# Patient Record
Sex: FEMALE | Race: WHITE | NOT HISPANIC OR LATINO | Employment: FULL TIME | URBAN - METROPOLITAN AREA
[De-identification: names, ages, dates, MRNs, and addresses within clinical notes are randomized per-mention and may not be internally consistent; named-entity substitution may affect disease eponyms.]

---

## 2017-01-05 ENCOUNTER — OFFICE VISIT (OUTPATIENT)
Dept: PSYCHOLOGY | Facility: CLINIC | Age: 37
End: 2017-01-05
Payer: COMMERCIAL

## 2017-01-05 DIAGNOSIS — F43.23 ADJUSTMENT DISORDER WITH MIXED ANXIETY AND DEPRESSED MOOD: Primary | ICD-10-CM

## 2017-01-05 DIAGNOSIS — Z63.5 MARRIAGE SEPARATION: ICD-10-CM

## 2017-01-05 PROCEDURE — 90834 PSYTX W PT 45 MINUTES: CPT | Performed by: MARRIAGE & FAMILY THERAPIST

## 2017-01-05 SDOH — SOCIAL STABILITY - SOCIAL INSECURITY: DISRUPTION OF FAMILY BY SEPARATION AND DIVORCE: Z63.5

## 2017-01-05 ASSESSMENT — ANXIETY QUESTIONNAIRES
5. BEING SO RESTLESS THAT IT IS HARD TO SIT STILL: NOT AT ALL
2. NOT BEING ABLE TO STOP OR CONTROL WORRYING: NOT AT ALL
3. WORRYING TOO MUCH ABOUT DIFFERENT THINGS: SEVERAL DAYS
GAD7 TOTAL SCORE: 3
1. FEELING NERVOUS, ANXIOUS, OR ON EDGE: SEVERAL DAYS
6. BECOMING EASILY ANNOYED OR IRRITABLE: NOT AT ALL
IF YOU CHECKED OFF ANY PROBLEMS ON THIS QUESTIONNAIRE, HOW DIFFICULT HAVE THESE PROBLEMS MADE IT FOR YOU TO DO YOUR WORK, TAKE CARE OF THINGS AT HOME, OR GET ALONG WITH OTHER PEOPLE: SOMEWHAT DIFFICULT
7. FEELING AFRAID AS IF SOMETHING AWFUL MIGHT HAPPEN: NOT AT ALL

## 2017-01-05 ASSESSMENT — PATIENT HEALTH QUESTIONNAIRE - PHQ9: 5. POOR APPETITE OR OVEREATING: SEVERAL DAYS
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
## 2017-01-06 ASSESSMENT — PATIENT HEALTH QUESTIONNAIRE - PHQ9: SUM OF ALL RESPONSES TO PHQ QUESTIONS 1-9: 5

## 2017-01-06 ASSESSMENT — ANXIETY QUESTIONNAIRES: GAD7 TOTAL SCORE: 3

## 2017-02-09 ENCOUNTER — OFFICE VISIT (OUTPATIENT)
Dept: PSYCHOLOGY | Facility: CLINIC | Age: 37
End: 2017-02-09
Payer: COMMERCIAL

## 2017-02-09 DIAGNOSIS — Z63.5 MARRIAGE SEPARATION: ICD-10-CM

## 2017-02-09 DIAGNOSIS — F43.23 ADJUSTMENT DISORDER WITH MIXED ANXIETY AND DEPRESSED MOOD: Primary | ICD-10-CM

## 2017-02-09 PROCEDURE — 90834 PSYTX W PT 45 MINUTES: CPT | Performed by: MARRIAGE & FAMILY THERAPIST

## 2017-02-09 SDOH — SOCIAL STABILITY - SOCIAL INSECURITY: DISRUPTION OF FAMILY BY SEPARATION AND DIVORCE: Z63.5

## 2017-02-09 ASSESSMENT — ANXIETY QUESTIONNAIRES
3. WORRYING TOO MUCH ABOUT DIFFERENT THINGS: MORE THAN HALF THE DAYS
IF YOU CHECKED OFF ANY PROBLEMS ON THIS QUESTIONNAIRE, HOW DIFFICULT HAVE THESE PROBLEMS MADE IT FOR YOU TO DO YOUR WORK, TAKE CARE OF THINGS AT HOME, OR GET ALONG WITH OTHER PEOPLE: SOMEWHAT DIFFICULT
2. NOT BEING ABLE TO STOP OR CONTROL WORRYING: SEVERAL DAYS
6. BECOMING EASILY ANNOYED OR IRRITABLE: SEVERAL DAYS
5. BEING SO RESTLESS THAT IT IS HARD TO SIT STILL: NOT AT ALL
1. FEELING NERVOUS, ANXIOUS, OR ON EDGE: MORE THAN HALF THE DAYS
GAD7 TOTAL SCORE: 8
7. FEELING AFRAID AS IF SOMETHING AWFUL MIGHT HAPPEN: SEVERAL DAYS

## 2017-02-09 ASSESSMENT — PATIENT HEALTH QUESTIONNAIRE - PHQ9: 5. POOR APPETITE OR OVEREATING: SEVERAL DAYS
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
## 2017-02-10 ASSESSMENT — ANXIETY QUESTIONNAIRES: GAD7 TOTAL SCORE: 8

## 2017-02-10 ASSESSMENT — PATIENT HEALTH QUESTIONNAIRE - PHQ9: SUM OF ALL RESPONSES TO PHQ QUESTIONS 1-9: 6

## 2017-03-14 ENCOUNTER — OFFICE VISIT (OUTPATIENT)
Dept: PSYCHOLOGY | Facility: CLINIC | Age: 37
End: 2017-03-14
Payer: COMMERCIAL

## 2017-03-14 DIAGNOSIS — Z63.5 MARRIAGE SEPARATION: ICD-10-CM

## 2017-03-14 DIAGNOSIS — F43.23 ADJUSTMENT DISORDER WITH MIXED ANXIETY AND DEPRESSED MOOD: Primary | ICD-10-CM

## 2017-03-14 PROCEDURE — 90834 PSYTX W PT 45 MINUTES: CPT | Performed by: MARRIAGE & FAMILY THERAPIST

## 2017-03-14 SDOH — SOCIAL STABILITY - SOCIAL INSECURITY: DISRUPTION OF FAMILY BY SEPARATION AND DIVORCE: Z63.5

## 2017-03-14 ASSESSMENT — ANXIETY QUESTIONNAIRES
5. BEING SO RESTLESS THAT IT IS HARD TO SIT STILL: NOT AT ALL
GAD7 TOTAL SCORE: 4
7. FEELING AFRAID AS IF SOMETHING AWFUL MIGHT HAPPEN: NOT AT ALL
3. WORRYING TOO MUCH ABOUT DIFFERENT THINGS: SEVERAL DAYS
6. BECOMING EASILY ANNOYED OR IRRITABLE: NOT AT ALL
2. NOT BEING ABLE TO STOP OR CONTROL WORRYING: SEVERAL DAYS
IF YOU CHECKED OFF ANY PROBLEMS ON THIS QUESTIONNAIRE, HOW DIFFICULT HAVE THESE PROBLEMS MADE IT FOR YOU TO DO YOUR WORK, TAKE CARE OF THINGS AT HOME, OR GET ALONG WITH OTHER PEOPLE: SOMEWHAT DIFFICULT
1. FEELING NERVOUS, ANXIOUS, OR ON EDGE: SEVERAL DAYS

## 2017-03-14 ASSESSMENT — PATIENT HEALTH QUESTIONNAIRE - PHQ9: 5. POOR APPETITE OR OVEREATING: SEVERAL DAYS
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
## 2017-03-15 ASSESSMENT — ANXIETY QUESTIONNAIRES: GAD7 TOTAL SCORE: 4

## 2017-03-15 ASSESSMENT — PATIENT HEALTH QUESTIONNAIRE - PHQ9: SUM OF ALL RESPONSES TO PHQ QUESTIONS 1-9: 6

## 2017-04-13 ENCOUNTER — OFFICE VISIT (OUTPATIENT)
Dept: PSYCHOLOGY | Facility: CLINIC | Age: 37
End: 2017-04-13
Payer: COMMERCIAL

## 2017-04-13 DIAGNOSIS — F43.23 ADJUSTMENT DISORDER WITH MIXED ANXIETY AND DEPRESSED MOOD: Primary | ICD-10-CM

## 2017-04-13 DIAGNOSIS — Z63.5 MARRIAGE SEPARATION: ICD-10-CM

## 2017-04-13 PROCEDURE — 90834 PSYTX W PT 45 MINUTES: CPT | Performed by: MARRIAGE & FAMILY THERAPIST

## 2017-04-13 SDOH — SOCIAL STABILITY - SOCIAL INSECURITY: DISRUPTION OF FAMILY BY SEPARATION AND DIVORCE: Z63.5

## 2017-04-13 ASSESSMENT — ANXIETY QUESTIONNAIRES
GAD7 TOTAL SCORE: 5
6. BECOMING EASILY ANNOYED OR IRRITABLE: NOT AT ALL
2. NOT BEING ABLE TO STOP OR CONTROL WORRYING: SEVERAL DAYS
7. FEELING AFRAID AS IF SOMETHING AWFUL MIGHT HAPPEN: SEVERAL DAYS
IF YOU CHECKED OFF ANY PROBLEMS ON THIS QUESTIONNAIRE, HOW DIFFICULT HAVE THESE PROBLEMS MADE IT FOR YOU TO DO YOUR WORK, TAKE CARE OF THINGS AT HOME, OR GET ALONG WITH OTHER PEOPLE: SOMEWHAT DIFFICULT
3. WORRYING TOO MUCH ABOUT DIFFERENT THINGS: SEVERAL DAYS
1. FEELING NERVOUS, ANXIOUS, OR ON EDGE: SEVERAL DAYS
5. BEING SO RESTLESS THAT IT IS HARD TO SIT STILL: NOT AT ALL

## 2017-04-13 ASSESSMENT — PATIENT HEALTH QUESTIONNAIRE - PHQ9: 5. POOR APPETITE OR OVEREATING: SEVERAL DAYS
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
## 2017-04-13 NOTE — MR AVS SNAPSHOT
MRN:[id number]                      After Visit Summary   4/13/2017    Cely Mott    MRN: [id number]           Visit Information        Provider Department      4/13/2017 3:00 PM Russ Jackson LMFT [facility]      Your next 10 appointments already scheduled     May 10, 2017  3:00 PM CDT   Return Visit with Russ IVY Jackson   [facility] ([facility])    [address] [place] [id number] [phone]              MyChart Information     [employer] gives you secure access to your electronic health record. If you see a primary care provider, you can also send messages to your care team and make appointments. If you have questions, please call your primary care clinic.  If you do not have a primary care provider, please call [phone] and they will assist you.        Care EveryWhere ID     This is your Care EveryWhere ID. This could be used by other organizations to access your [place] medical records  [id number]

## 2017-04-14 ASSESSMENT — ANXIETY QUESTIONNAIRES: GAD7 TOTAL SCORE: 5

## 2017-04-14 ASSESSMENT — PATIENT HEALTH QUESTIONNAIRE - PHQ9: SUM OF ALL RESPONSES TO PHQ QUESTIONS 1-9: 3

## 2017-05-10 ENCOUNTER — OFFICE VISIT (OUTPATIENT)
Dept: PSYCHOLOGY | Facility: CLINIC | Age: 37
End: 2017-05-10
Payer: COMMERCIAL

## 2017-05-10 DIAGNOSIS — Z63.5 MARRIAGE SEPARATION: ICD-10-CM

## 2017-05-10 DIAGNOSIS — F43.23 ADJUSTMENT DISORDER WITH MIXED ANXIETY AND DEPRESSED MOOD: Primary | ICD-10-CM

## 2017-05-10 PROCEDURE — 90834 PSYTX W PT 45 MINUTES: CPT | Performed by: MARRIAGE & FAMILY THERAPIST

## 2017-05-10 SDOH — SOCIAL STABILITY - SOCIAL INSECURITY: DISRUPTION OF FAMILY BY SEPARATION AND DIVORCE: Z63.5

## 2017-05-10 ASSESSMENT — ANXIETY QUESTIONNAIRES
7. FEELING AFRAID AS IF SOMETHING AWFUL MIGHT HAPPEN: SEVERAL DAYS
6. BECOMING EASILY ANNOYED OR IRRITABLE: SEVERAL DAYS
IF YOU CHECKED OFF ANY PROBLEMS ON THIS QUESTIONNAIRE, HOW DIFFICULT HAVE THESE PROBLEMS MADE IT FOR YOU TO DO YOUR WORK, TAKE CARE OF THINGS AT HOME, OR GET ALONG WITH OTHER PEOPLE: SOMEWHAT DIFFICULT
2. NOT BEING ABLE TO STOP OR CONTROL WORRYING: SEVERAL DAYS
GAD7 TOTAL SCORE: 5
3. WORRYING TOO MUCH ABOUT DIFFERENT THINGS: SEVERAL DAYS
1. FEELING NERVOUS, ANXIOUS, OR ON EDGE: SEVERAL DAYS
5. BEING SO RESTLESS THAT IT IS HARD TO SIT STILL: NOT AT ALL

## 2017-05-10 ASSESSMENT — PATIENT HEALTH QUESTIONNAIRE - PHQ9: 5. POOR APPETITE OR OVEREATING: NOT AT ALL

## 2017-05-10 NOTE — MR AVS SNAPSHOT
MRN:[id number]                      After Visit Summary   5/10/2017    Cely Mott    MRN: [id number]           Visit Information        Provider Department      5/10/2017 3:00 PM Russ Jackson LMFT [facility] Generic      Your next 10 appointments already scheduled     Jun 14, 2017  3:00 PM CDT   Return Visit with Russ IVY Jackson   [facility] ([facility])    [address] [place] [id number] [phone]              MyChart Information     [employer] gives you secure access to your electronic health record. If you see a primary care provider, you can also send messages to your care team and make appointments. If you have questions, please call your primary care clinic.  If you do not have a primary care provider, please call [phone] and they will assist you.        Care EveryWhere ID     This is your Care EveryWhere ID. This could be used by other organizations to access your [place] medical records  [id number]
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
## 2017-05-11 ASSESSMENT — PATIENT HEALTH QUESTIONNAIRE - PHQ9: SUM OF ALL RESPONSES TO PHQ QUESTIONS 1-9: 2

## 2017-05-11 ASSESSMENT — ANXIETY QUESTIONNAIRES: GAD7 TOTAL SCORE: 5

## 2017-06-14 ENCOUNTER — OFFICE VISIT (OUTPATIENT)
Dept: PSYCHOLOGY | Facility: CLINIC | Age: 37
End: 2017-06-14
Payer: COMMERCIAL

## 2017-06-14 DIAGNOSIS — F43.23 ADJUSTMENT DISORDER WITH MIXED ANXIETY AND DEPRESSED MOOD: Primary | ICD-10-CM

## 2017-06-14 DIAGNOSIS — Z63.5 MARRIAGE SEPARATION: ICD-10-CM

## 2017-06-14 PROCEDURE — 90834 PSYTX W PT 45 MINUTES: CPT | Performed by: MARRIAGE & FAMILY THERAPIST

## 2017-06-14 SDOH — SOCIAL STABILITY - SOCIAL INSECURITY: DISRUPTION OF FAMILY BY SEPARATION AND DIVORCE: Z63.5

## 2017-06-14 NOTE — MR AVS SNAPSHOT
MRN:[id number]                      After Visit Summary   6/14/2017    Cely Mott    MRN: [id number]           Visit Information        Provider Department      6/14/2017 3:00 PM Renetta RussIVY ugarte [facility] Generic      Your next 10 appointments already scheduled     Jul 20, 2017  3:00 PM CDT   Return Visit with IVY Jane   [facility] ([facility])    [id number] [place] [id number] [phone]              MyChart Information     [facility] gives you secure access to your electronic health record. If you see a primary care provider, you can also send messages to your care team and make appointments. If you have questions, please call your primary care clinic.  If you do not have a primary care provider, please call [phone] and they will assist you.        Care EveryWhere ID     This is your Care EveryWhere ID. This could be used by other organizations to access your [facility] medical records  [id number]        Equal Access to Services     YEYO COLEMAN : Hadii tim ku hadasho Soomaali, waaxda luqadaha, qaybta kaalmada adeegyada, cynthia ashley . So [facility] [phone].    ATENCIÓN: Si habla español, tiene a milton disposición servicios gratuitos de asistencia lingüística. Llame al [phone].    We comply with applicable federal civil rights laws and [place] laws. We do not discriminate on the basis of race, color, national origin, age, disability sex, sexual orientation or gender identity.

## 2017-07-20 ENCOUNTER — OFFICE VISIT (OUTPATIENT)
Dept: PSYCHOLOGY | Facility: CLINIC | Age: 37
End: 2017-07-20
Payer: COMMERCIAL

## 2017-07-20 DIAGNOSIS — Z63.5 MARRIAGE SEPARATION: ICD-10-CM

## 2017-07-20 DIAGNOSIS — F43.23 ADJUSTMENT DISORDER WITH MIXED ANXIETY AND DEPRESSED MOOD: Primary | ICD-10-CM

## 2017-07-20 PROCEDURE — 90834 PSYTX W PT 45 MINUTES: CPT | Performed by: MARRIAGE & FAMILY THERAPIST

## 2017-07-20 SDOH — SOCIAL STABILITY - SOCIAL INSECURITY: DISRUPTION OF FAMILY BY SEPARATION AND DIVORCE: Z63.5

## 2017-07-20 ASSESSMENT — ANXIETY QUESTIONNAIRES
IF YOU CHECKED OFF ANY PROBLEMS ON THIS QUESTIONNAIRE, HOW DIFFICULT HAVE THESE PROBLEMS MADE IT FOR YOU TO DO YOUR WORK, TAKE CARE OF THINGS AT HOME, OR GET ALONG WITH OTHER PEOPLE: SOMEWHAT DIFFICULT
5. BEING SO RESTLESS THAT IT IS HARD TO SIT STILL: NOT AT ALL
GAD7 TOTAL SCORE: 9
3. WORRYING TOO MUCH ABOUT DIFFERENT THINGS: MORE THAN HALF THE DAYS
6. BECOMING EASILY ANNOYED OR IRRITABLE: NOT AT ALL
2. NOT BEING ABLE TO STOP OR CONTROL WORRYING: MORE THAN HALF THE DAYS
1. FEELING NERVOUS, ANXIOUS, OR ON EDGE: MORE THAN HALF THE DAYS
7. FEELING AFRAID AS IF SOMETHING AWFUL MIGHT HAPPEN: SEVERAL DAYS

## 2017-07-20 ASSESSMENT — PATIENT HEALTH QUESTIONNAIRE - PHQ9: 5. POOR APPETITE OR OVEREATING: MORE THAN HALF THE DAYS
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
## 2017-07-21 ASSESSMENT — ANXIETY QUESTIONNAIRES: GAD7 TOTAL SCORE: 9

## 2017-07-21 ASSESSMENT — PATIENT HEALTH QUESTIONNAIRE - PHQ9: SUM OF ALL RESPONSES TO PHQ QUESTIONS 1-9: 7

## 2017-08-24 ENCOUNTER — OFFICE VISIT (OUTPATIENT)
Dept: PSYCHOLOGY | Facility: CLINIC | Age: 37
End: 2017-08-24
Payer: COMMERCIAL

## 2017-08-24 DIAGNOSIS — Z63.5 MARRIAGE SEPARATION: ICD-10-CM

## 2017-08-24 DIAGNOSIS — F43.23 ADJUSTMENT DISORDER WITH MIXED ANXIETY AND DEPRESSED MOOD: Primary | ICD-10-CM

## 2017-08-24 PROCEDURE — 90834 PSYTX W PT 45 MINUTES: CPT | Performed by: MARRIAGE & FAMILY THERAPIST

## 2017-08-24 SDOH — SOCIAL STABILITY - SOCIAL INSECURITY: DISRUPTION OF FAMILY BY SEPARATION AND DIVORCE: Z63.5

## 2017-08-24 ASSESSMENT — ANXIETY QUESTIONNAIRES
7. FEELING AFRAID AS IF SOMETHING AWFUL MIGHT HAPPEN: SEVERAL DAYS
2. NOT BEING ABLE TO STOP OR CONTROL WORRYING: SEVERAL DAYS
3. WORRYING TOO MUCH ABOUT DIFFERENT THINGS: SEVERAL DAYS
6. BECOMING EASILY ANNOYED OR IRRITABLE: SEVERAL DAYS
IF YOU CHECKED OFF ANY PROBLEMS ON THIS QUESTIONNAIRE, HOW DIFFICULT HAVE THESE PROBLEMS MADE IT FOR YOU TO DO YOUR WORK, TAKE CARE OF THINGS AT HOME, OR GET ALONG WITH OTHER PEOPLE: SOMEWHAT DIFFICULT
5. BEING SO RESTLESS THAT IT IS HARD TO SIT STILL: NOT AT ALL
1. FEELING NERVOUS, ANXIOUS, OR ON EDGE: SEVERAL DAYS
GAD7 TOTAL SCORE: 6

## 2017-08-24 ASSESSMENT — PATIENT HEALTH QUESTIONNAIRE - PHQ9
SUM OF ALL RESPONSES TO PHQ QUESTIONS 1-9: 3
5. POOR APPETITE OR OVEREATING: SEVERAL DAYS
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
## 2017-08-25 ASSESSMENT — ANXIETY QUESTIONNAIRES: GAD7 TOTAL SCORE: 6

## 2017-09-28 ENCOUNTER — OFFICE VISIT (OUTPATIENT)
Dept: PSYCHOLOGY | Facility: CLINIC | Age: 37
End: 2017-09-28
Payer: COMMERCIAL

## 2017-09-28 DIAGNOSIS — Z63.5 MARRIAGE SEPARATION: ICD-10-CM

## 2017-09-28 DIAGNOSIS — F43.23 ADJUSTMENT DISORDER WITH MIXED ANXIETY AND DEPRESSED MOOD: Primary | ICD-10-CM

## 2017-09-28 PROCEDURE — 90791 PSYCH DIAGNOSTIC EVALUATION: CPT | Performed by: MARRIAGE & FAMILY THERAPIST

## 2017-09-28 SDOH — SOCIAL STABILITY - SOCIAL INSECURITY: DISRUPTION OF FAMILY BY SEPARATION AND DIVORCE: Z63.5

## 2017-09-28 ASSESSMENT — PATIENT HEALTH QUESTIONNAIRE - PHQ9
5. POOR APPETITE OR OVEREATING: SEVERAL DAYS
SUM OF ALL RESPONSES TO PHQ QUESTIONS 1-9: 2

## 2017-09-28 ASSESSMENT — ANXIETY QUESTIONNAIRES
GAD7 TOTAL SCORE: 4
3. WORRYING TOO MUCH ABOUT DIFFERENT THINGS: SEVERAL DAYS
IF YOU CHECKED OFF ANY PROBLEMS ON THIS QUESTIONNAIRE, HOW DIFFICULT HAVE THESE PROBLEMS MADE IT FOR YOU TO DO YOUR WORK, TAKE CARE OF THINGS AT HOME, OR GET ALONG WITH OTHER PEOPLE: SOMEWHAT DIFFICULT
2. NOT BEING ABLE TO STOP OR CONTROL WORRYING: NOT AT ALL
1. FEELING NERVOUS, ANXIOUS, OR ON EDGE: SEVERAL DAYS
5. BEING SO RESTLESS THAT IT IS HARD TO SIT STILL: NOT AT ALL
7. FEELING AFRAID AS IF SOMETHING AWFUL MIGHT HAPPEN: NOT AT ALL
6. BECOMING EASILY ANNOYED OR IRRITABLE: SEVERAL DAYS
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
## 2017-09-29 ASSESSMENT — ANXIETY QUESTIONNAIRES: GAD7 TOTAL SCORE: 4

## 2017-10-26 ENCOUNTER — OFFICE VISIT (OUTPATIENT)
Dept: PSYCHOLOGY | Facility: CLINIC | Age: 37
End: 2017-10-26
Payer: COMMERCIAL

## 2017-10-26 DIAGNOSIS — Z63.5 MARRIAGE SEPARATION: ICD-10-CM

## 2017-10-26 DIAGNOSIS — F43.23 ADJUSTMENT DISORDER WITH MIXED ANXIETY AND DEPRESSED MOOD: Primary | ICD-10-CM

## 2017-10-26 PROCEDURE — 90834 PSYTX W PT 45 MINUTES: CPT | Performed by: MARRIAGE & FAMILY THERAPIST

## 2017-10-26 SDOH — SOCIAL STABILITY - SOCIAL INSECURITY: DISRUPTION OF FAMILY BY SEPARATION AND DIVORCE: Z63.5

## 2017-10-26 ASSESSMENT — PATIENT HEALTH QUESTIONNAIRE - PHQ9
SUM OF ALL RESPONSES TO PHQ QUESTIONS 1-9: 3
5. POOR APPETITE OR OVEREATING: SEVERAL DAYS

## 2017-10-26 ASSESSMENT — ANXIETY QUESTIONNAIRES
GAD7 TOTAL SCORE: 2
6. BECOMING EASILY ANNOYED OR IRRITABLE: NOT AT ALL
IF YOU CHECKED OFF ANY PROBLEMS ON THIS QUESTIONNAIRE, HOW DIFFICULT HAVE THESE PROBLEMS MADE IT FOR YOU TO DO YOUR WORK, TAKE CARE OF THINGS AT HOME, OR GET ALONG WITH OTHER PEOPLE: SOMEWHAT DIFFICULT
5. BEING SO RESTLESS THAT IT IS HARD TO SIT STILL: NOT AT ALL
3. WORRYING TOO MUCH ABOUT DIFFERENT THINGS: SEVERAL DAYS
7. FEELING AFRAID AS IF SOMETHING AWFUL MIGHT HAPPEN: NOT AT ALL
1. FEELING NERVOUS, ANXIOUS, OR ON EDGE: NOT AT ALL
2. NOT BEING ABLE TO STOP OR CONTROL WORRYING: NOT AT ALL

## 2017-10-26 NOTE — MR AVS SNAPSHOT
MRN:[id number]                      After Visit Summary   10/26/2017    Cely Mott    MRN: [id number]           Visit Information        Provider Department      10/26/2017 3:00 PM Russ Jackson LMFT [facility] Generic      Your next 10 appointments already scheduled     Nov 28, 2017  3:00 PM CST   Return Visit with IVY Jane   [facility] ([facility])    [address] [place] [id number] [phone]              MyChart Information     [email or web address] gives you secure access to your electronic health record. If you see a primary care provider, you can also send messages to your care team and make appointments. If you have questions, please call your primary care clinic.  If you do not have a primary care provider, please call [phone] and they will assist you.        Care EveryWhere ID     This is your Care EveryWhere ID. This could be used by other organizations to access your [place] medical records  [id number]        Equal Access to Services     YEYO COLEMAN : Hadii tim montgomery hadasho Soomaali, waaxda luqadaha, qaybta kaalmada adeegyada, cynthia ashley . So [facility] [phone].    ATENCIÓN: Si habla español, tiene a milton disposición servicios gratuitos de asistencia lingüística. Llame al [phone].    We comply with applicable federal civil rights laws and [place] laws. We do not discriminate on the basis of race, color, national origin, age, disability, sex, sexual orientation, or gender identity.
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
## 2017-10-27 ASSESSMENT — ANXIETY QUESTIONNAIRES: GAD7 TOTAL SCORE: 2

## 2017-11-16 ENCOUNTER — HOSPITAL PATHOLOGY (OUTPATIENT)
Dept: OTHER | Facility: CLINIC | Age: 37
End: 2017-11-16

## 2017-11-16 ENCOUNTER — TRANSFERRED RECORDS (OUTPATIENT)
Dept: HEALTH INFORMATION MANAGEMENT | Facility: CLINIC | Age: 37
End: 2017-11-16

## 2017-11-20 LAB — COPATH REPORT: NORMAL

## 2017-11-28 ENCOUNTER — OFFICE VISIT (OUTPATIENT)
Dept: PSYCHOLOGY | Facility: CLINIC | Age: 37
End: 2017-11-28
Payer: COMMERCIAL

## 2017-11-28 ENCOUNTER — HOSPITAL PATHOLOGY (OUTPATIENT)
Dept: OTHER | Facility: CLINIC | Age: 37
End: 2017-11-28

## 2017-11-28 ENCOUNTER — TRANSFERRED RECORDS (OUTPATIENT)
Dept: HEALTH INFORMATION MANAGEMENT | Facility: CLINIC | Age: 37
End: 2017-11-28

## 2017-11-28 DIAGNOSIS — F43.23 ADJUSTMENT DISORDER WITH MIXED ANXIETY AND DEPRESSED MOOD: Primary | ICD-10-CM

## 2017-11-28 DIAGNOSIS — Z63.5 FAMILY DISRUPTION DUE TO DIVORCE OR LEGAL SEPARATION: ICD-10-CM

## 2017-11-28 PROCEDURE — 90834 PSYTX W PT 45 MINUTES: CPT | Performed by: MARRIAGE & FAMILY THERAPIST

## 2017-11-28 SDOH — SOCIAL STABILITY - SOCIAL INSECURITY: DISRUPTION OF FAMILY BY SEPARATION AND DIVORCE: Z63.5

## 2017-11-28 NOTE — MR AVS SNAPSHOT
MRN:[id number]                      After Visit Summary   11/28/2017    Cely Mott    MRN: [id number]           Visit Information        Provider Department      11/28/2017 3:00 PM Russ Jackson LMFT [facility] Generic      Your next 10 appointments already scheduled     Dec 28, 2017 10:00 AM CST   Return Visit with IYV Jane   [facility] ([facility])    [address] [place] [id number] [phone]              MyChart Information     [employer] gives you secure access to your electronic health record. If you see a primary care provider, you can also send messages to your care team and make appointments. If you have questions, please call your primary care clinic.  If you do not have a primary care provider, please call [phone] and they will assist you.        Care EveryWhere ID     This is your Care EveryWhere ID. This could be used by other organizations to access your [facility] medical records  [id number]        Equal Access to Services     YEYO COLEMAN : Hadii tim montgomery hadasho Soomaali, waaxda luqadaha, qaybta kaalmada adeegyada, cynthia ashley . So [facility] [phone].    ATENCIÓN: Si habla español, tiene a milton disposición servicios gratuitos de asistencia lingüística. Llame al [phone].    We comply with applicable federal civil rights laws and [place] laws. We do not discriminate on the basis of race, color, national origin, age, disability, sex, sexual orientation, or gender identity.
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
## 2017-11-29 PROBLEM — Z63.5 FAMILY DISRUPTION DUE TO DIVORCE OR LEGAL SEPARATION: Status: ACTIVE | Noted: 2017-11-29

## 2017-11-30 LAB — COPATH REPORT: NORMAL

## 2017-12-21 ENCOUNTER — OFFICE VISIT (OUTPATIENT)
Dept: FAMILY MEDICINE | Facility: CLINIC | Age: 37
End: 2017-12-21
Payer: COMMERCIAL

## 2017-12-21 VITALS
HEIGHT: 65 IN | DIASTOLIC BLOOD PRESSURE: 66 MMHG | HEART RATE: 64 BPM | OXYGEN SATURATION: 98 % | BODY MASS INDEX: 23.49 KG/M2 | TEMPERATURE: 98.7 F | SYSTOLIC BLOOD PRESSURE: 116 MMHG | WEIGHT: 141 LBS | RESPIRATION RATE: 16 BRPM

## 2017-12-21 DIAGNOSIS — F43.23 ADJUSTMENT DISORDER WITH MIXED ANXIETY AND DEPRESSED MOOD: Primary | ICD-10-CM

## 2017-12-21 DIAGNOSIS — R07.89 ATYPICAL CHEST PAIN: ICD-10-CM

## 2017-12-21 DIAGNOSIS — R10.11 RUQ ABDOMINAL PAIN: ICD-10-CM

## 2017-12-21 PROCEDURE — 93000 ELECTROCARDIOGRAM COMPLETE: CPT | Performed by: PHYSICIAN ASSISTANT

## 2017-12-21 PROCEDURE — 99214 OFFICE O/P EST MOD 30 MIN: CPT | Performed by: PHYSICIAN ASSISTANT

## 2017-12-21 RX ORDER — CLINDAMYCIN PHOSPHATE, BENZOYL PEROXIDE 25; 10 MG/G; MG/G
0.5 GEL TOPICAL DAILY
Qty: 50 G | Refills: 11 | COMMUNITY
Start: 2017-12-21 | End: 2021-10-20

## 2017-12-21 RX ORDER — OMEPRAZOLE 40 MG/1
40 CAPSULE, DELAYED RELEASE ORAL DAILY
Qty: 30 CAPSULE | Refills: 1 | Status: SHIPPED | OUTPATIENT
Start: 2017-12-21 | End: 2021-10-20

## 2017-12-21 NOTE — LETTER
My Depression Action Plan  Name: Cely Mott   Date of Birth 1980  Date: 12/21/2017    My doctor: [facility]   My clinic: [facility]  [id number] [place] [id number] [phone]          GREEN    ZONE   Good Control    What it looks like:     Things are going generally well. You have normal up s and down s. You may even feel depressed from time to time, but bad moods usually last less than a day.   What you need to do:  1. Continue to care for yourself (see self care plan)  2. Check your depression survival kit and update it as needed  3. Follow your physician s recommendations including any medication.  4. Do not stop taking medication unless you consult with your physician first.           YELLOW         ZONE Getting Worse    What it looks like:     Depression is starting to interfere with your life.     It may be hard to get out of bed; you may be starting to isolate yourself from others.    Symptoms of depression are starting to last most all day and this has happened for several days.     You may have suicidal thoughts but they are not constant.   What you need to do:     1. Call your care team, your response to treatment will improve if you keep your care team informed of your progress. Yellow periods are signs an adjustment may need to be made.     2. Continue your self-care, even if you have to fake it!    3. Talk to someone in your support network    4. Open up your depression survival kit           RED    ZONE Medical Alert - Get Help    What it looks like:     Depression is seriously interfering with your life.     You may experience these or other symptoms: You can t get out of bed most days, can t work or engage in other necessary activities, you have trouble taking care of basic hygiene, or basic responsibilities, thoughts of suicide or death that will not go away, self-injurious behavior.     What you need to do:  1. Call your care  team and request a same-day appointment. If they are not available (weekends or after hours) call your local crisis line, emergency room or 911.      Electronically signed by: Viet Owen, December 21, 2017    Depression Self Care Plan / Survival Kit    Self-Care for Depression  Here s the deal. Your body and mind are really not as separate as most people think.  What you do and think affects how you feel and how you feel influences what you do and think. This means if you do things that people who feel good do, it will help you feel better.  Sometimes this is all it takes.  There is also a place for medication and therapy depending on how severe your depression is, so be sure to consult with your medical provider and/ or Behavioral Health Consultant if your symptoms are worsening or not improving.     In order to better manage my stress, I will:    Exercise  Get some form of exercise, every day. This will help reduce pain and release endorphins, the  feel good  chemicals in your brain. This is almost as good as taking antidepressants!  This is not the same as joining a gym and then never going! (they count on that by the way ) It can be as simple as just going for a walk or doing some gardening, anything that will get you moving.      Hygiene   Maintain good hygiene (Get out of bed in the morning, Make your bed, Brush your teeth, Take a shower, and Get dressed like you were going to work, even if you are unemployed).  If your clothes don't fit try to get ones that do.    Diet  I will strive to eat foods that are good for me, drink plenty of water, and avoid excessive sugar, caffeine, alcohol, and other mood-altering substances.  Some foods that are helpful in depression are: complex carbohydrates, B vitamins, flaxseed, fish or fish oil, fresh fruits and vegetables.    Psychotherapy  I agree to participate in Individual Therapy (if recommended).    Medication  If prescribed medications, I agree to take them.   Missing doses can result in serious side effects.  I understand that drinking alcohol, or other illicit drug use, may cause potential side effects.  I will not stop my medication abruptly without first discussing it with my provider.    Staying Connected With Others  I will stay in touch with my friends, family members, and my primary care provider/team.    Use your imagination  Be creative.  We all have a creative side; it doesn t matter if it s oil painting, sand castles, or mud pies! This will also kick up the endorphins.    Witness Beauty  (AKA stop and smell the roses) Take a look outside, even in mid-winter. Notice colors, textures. Watch the squirrels and birds.     Service to others  Be of service to others.  There is always someone else in need.  By helping others we can  get out of ourselves  and remember the really important things.  This also provides opportunities for practicing all the other parts of the program.    Humor  Laugh and be silly!  Adjust your TV habits for less news and crime-drama and more comedy.    Control your stress  Try breathing deep, massage therapy, biofeedback, and meditation. Find time to relax each day.     My support system    Clinic Contact:  Phone number:    Contact 1:  Phone number:    Contact 2:  Phone number:    Church/:  Phone number:    Therapist:  Phone number:    Local crisis center:    Phone number:    Other community support:  Phone number:

## 2017-12-21 NOTE — NURSING NOTE
"Chief Complaint   Patient presents with     Abdominal Pain       Initial /66 (BP Location: Right arm, Patient Position: Chair, Cuff Size: Adult Regular)  Pulse 64  Temp 98.7  F (37.1  C) (Oral)  Resp 16  Ht 5' 5\" (1.651 m)  Wt 141 lb (64 kg)  LMP 12/01/2017  SpO2 98%  Breastfeeding? No  BMI 23.46 kg/m2 Estimated body mass index is 23.46 kg/(m^2) as calculated from the following:    Height as of this encounter: 5' 5\" (1.651 m).    Weight as of this encounter: 141 lb (64 kg).  Medication Reconciliation: complete     Viet Owen CMA  Rectal exam done on NOV and DEC, ZEYAD done on 12/21/2017.Viet Owen CMA      "

## 2017-12-21 NOTE — PATIENT INSTRUCTIONS
(F43.23) Adjustment disorder with mixed anxiety and depressed mood  (primary encounter diagnosis)  Comment:   Plan:     (R10.11) RUQ abdominal pain  Comment: ? Etiology?  Gallstone    Plan: omeprazole (PRILOSEC) 40 MG capsule, US Abdomen        Limited            (R07.89) Atypical chest pain  Comment:   Plan: EKG 12-lead complete w/read - Clinics

## 2017-12-21 NOTE — MR AVS SNAPSHOT
After Visit Summary   12/21/2017    Cely Mott    MRN: [id number]           Patient Information     Date Of Birth          1980        Visit Information        Provider Department      12/21/2017 3:15 PM Aaseby-Aguilera, Ramona Ann, PA-C [facility]        Today's Diagnoses     Adjustment disorder with mixed anxiety and depressed mood    -  1    RUQ abdominal pain        Atypical chest pain          Care Instructions    (F43.23) Adjustment disorder with mixed anxiety and depressed mood  (primary encounter diagnosis)  Comment:   Plan:     (R10.11) RUQ abdominal pain  Comment: ? Etiology?  Gallstone    Plan: omeprazole (PRILOSEC) 40 MG capsule, US Abdomen        Limited            (R07.89) Atypical chest pain  Comment:   Plan: EKG 12-lead complete w/read - Clinics                      Follow-ups after your visit        Your next 10 appointments already scheduled     Dec 28, 2017 10:00 AM CST   Return Visit with IVY Jane   [facility] ([facility])    [address] [facility] [address] [phone]              Future tests that were ordered for you today     Open Future Orders        Priority Expected Expires Ordered    US Abdomen Limited Routine  12/21/2018 12/21/2017            Who to contact     If you have questions or need follow up information about today's clinic visit or your schedule please contact [facility] directly at [phone].  Normal or non-critical lab and imaging results will be communicated to you by MyChart, letter or phone within 4 business days after the clinic has received the results. If you do not hear from us within 7 days, please contact the clinic through MyChart or phone. If you have a critical or abnormal lab result, we will notify you by phone as soon as possible.  Submit refill requests through Fl[employer]ter or call your pharmacy and they will forward the refill request to us. Please  "allow 3 business days for your refill to be completed.          Additional Information About Your Visit        StudyRoom[employer] Information     [employer] gives you secure access to your electronic health record. If you see a primary care provider, you can also send messages to your care team and make appointments. If you have questions, please call your primary care clinic.  If you do not have a primary care provider, please call [phone] and they will assist you.        Care EveryWhere ID     This is your Care EveryWhere ID. This could be used by other organizations to access your [place] medical records  [id number]        Your Vitals Were     Pulse Temperature Respirations Height Last Period Pulse Oximetry    64 98.7  F (37.1  C) (Oral) 16 5' 5\" (1.651 m) 12/01/2017 98%    Breastfeeding? BMI (Body Mass Index)                No 23.46 kg/m2           Blood Pressure from Last 3 Encounters:   12/21/17 116/66   02/15/16 110/62   03/02/15 110/66    Weight from Last 3 Encounters:   12/21/17 141 lb (64 kg)   02/15/16 142 lb 8 oz (64.6 kg)   03/02/15 149 lb (67.6 kg)              We Performed the Following     DEPRESSION ACTION PLAN (DAP)     EKG 12-lead complete w/read - Clinics          Today's Medication Changes          These changes are accurate as of: 12/21/17  3:51 PM.  If you have any questions, ask your nurse or doctor.               Start taking these medicines.        Dose/Directions    omeprazole 40 MG capsule   Commonly known as:  priLOSEC   Used for:  RUQ abdominal pain   Started by:  Aaseby-Aguilera, Ramona Ann, PA-C        Dose:  40 mg   Take 1 capsule (40 mg) by mouth daily Take 30-60 minutes before a meal.   Quantity:  30 capsule   Refills:  1            Where to get your medicines      These medications were sent to [facility] [address] [place] [address] [facility] [address] [address] [facility], [facility] [address]     Phone:  [phone]     omeprazole 40 MG capsule          "       Primary Care Provider Office Phone # Fax #    [facility] [phone] [address]  [facility] [address]        Equal Access to Services     YEYO COLEMAN : Karthik montgomery curto Sosaraali, waaxda luqadaha, qaybta kaalmada adedanaeda, cynthia milner laJanekatrina bartholomew. So [facility] [phone].    ATENCIÓN: Si habla español, tiene a milton disposición servicios gratuitos de asistencia lingüística. Llame al [phone].    We comply with applicable federal civil rights laws and [place] laws. We do not discriminate on the basis of race, color, national origin, age, disability, sex, sexual orientation, or gender identity.            Thank you!     Thank you for choosing [facility]  for your care. Our goal is always to provide you with excellent care. Hearing back from our patients is one way we can continue to improve our services. Please take a few minutes to complete the written survey that you may receive in the mail after your visit with us. Thank you!             Your Updated Medication List - Protect others around you: Learn how to safely use, store and throw away your medicines at www.disposemymeds.org.          This list is accurate as of: 12/21/17  3:51 PM.  Always use your most recent med list.                   Brand Name Dispense Instructions for use Diagnosis    ACANYA 1.2-2.5 % Gel   Generic drug:  Clindamycin Phos-benzoyl Perox     50 g    Externally apply 0.5 inches topically daily Apply a pea sized amount to each affected area of the face        omeprazole 40 MG capsule    priLOSEC    30 capsule    Take 1 capsule (40 mg) by mouth daily Take 30-60 minutes before a meal.    RUQ abdominal pain       PRENATAL VITAMINS PO           VITAMIN C ER PO      Take  by mouth.

## 2017-12-28 ENCOUNTER — OFFICE VISIT (OUTPATIENT)
Dept: PSYCHOLOGY | Facility: CLINIC | Age: 37
End: 2017-12-28
Payer: COMMERCIAL

## 2017-12-28 DIAGNOSIS — F43.23 ADJUSTMENT DISORDER WITH MIXED ANXIETY AND DEPRESSED MOOD: Primary | ICD-10-CM

## 2017-12-28 DIAGNOSIS — Z63.5 FAMILY DISRUPTION DUE TO DIVORCE OR LEGAL SEPARATION: ICD-10-CM

## 2017-12-28 PROCEDURE — 90834 PSYTX W PT 45 MINUTES: CPT | Performed by: MARRIAGE & FAMILY THERAPIST

## 2017-12-28 SDOH — SOCIAL STABILITY - SOCIAL INSECURITY: DISRUPTION OF FAMILY BY SEPARATION AND DIVORCE: Z63.5

## 2017-12-28 ASSESSMENT — PATIENT HEALTH QUESTIONNAIRE - PHQ9
5. POOR APPETITE OR OVEREATING: SEVERAL DAYS
SUM OF ALL RESPONSES TO PHQ QUESTIONS 1-9: 0

## 2017-12-28 ASSESSMENT — ANXIETY QUESTIONNAIRES
6. BECOMING EASILY ANNOYED OR IRRITABLE: SEVERAL DAYS
GAD7 TOTAL SCORE: 4
1. FEELING NERVOUS, ANXIOUS, OR ON EDGE: SEVERAL DAYS
2. NOT BEING ABLE TO STOP OR CONTROL WORRYING: NOT AT ALL
3. WORRYING TOO MUCH ABOUT DIFFERENT THINGS: SEVERAL DAYS
IF YOU CHECKED OFF ANY PROBLEMS ON THIS QUESTIONNAIRE, HOW DIFFICULT HAVE THESE PROBLEMS MADE IT FOR YOU TO DO YOUR WORK, TAKE CARE OF THINGS AT HOME, OR GET ALONG WITH OTHER PEOPLE: SOMEWHAT DIFFICULT
7. FEELING AFRAID AS IF SOMETHING AWFUL MIGHT HAPPEN: NOT AT ALL
5. BEING SO RESTLESS THAT IT IS HARD TO SIT STILL: NOT AT ALL

## 2017-12-28 NOTE — MR AVS SNAPSHOT
MRN:[id number]                      After Visit Summary   12/28/2017    Cely Mott    MRN: [id number]           Visit Information        Provider Department      12/28/2017 10:00 AM Russ Jackson LMFT [facility] Generic      Your next 10 appointments already scheduled     Dec 29, 2017  8:00 AM CST   US ABDOMEN LIMITED with [email or web address]    ([facility])    [address] [facility] [address]  [facility] [address] [phone]           Please bring a list of your medicines (including vitamins, minerals and over-the-counter drugs). Also, tell your doctor about any allergies you may have. Wear comfortable clothes and leave your valuables at home.  Adults: No eating or drinking for 8 hours before the exam. You may take medicine with a small sip of water.  Children: - Children 6+ years: No food or drink for 6 hours before exam. - Children 1-5 years: No food or drink for 4 hours before exam. - Infants, breast-fed: may have breast milk up to 2 hours before exam. - Infants, formula: may have bottle until 4 hours before exam.  Please call the Imaging Department at your exam site with any questions.            Feb 01, 2018  1:00 PM CST   Return Visit with IVY Jane   [facility] ([facility])    [address] [place] [address] [phone]              MyChart Information     [facility] gives you secure access to your electronic health record. If you see a primary care provider, you can also send messages to your care team and make appointments. If you have questions, please call your primary care clinic.  If you do not have a primary care provider, please call [phone] and they will assist you.        Care EveryWhere ID     This is your Care EveryWhere ID. This could be used by other organizations to access your [place] medical records  [id number]        Equal  Access to Services     [facility] : Karthik Guardado, washayne hodges, qacynthia garcia. So [facility] [phone].    ATENCIÓN: Si habla español, tiene a milton disposición servicios gratuitos de asistencia lingüística. Llame al [phone].    We comply with applicable federal civil rights laws and [place] laws. We do not discriminate on the basis of race, color, national origin, age, disability, sex, sexual orientation, or gender identity.
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
## 2017-12-29 ENCOUNTER — HOSPITAL ENCOUNTER (OUTPATIENT)
Dept: ULTRASOUND IMAGING | Facility: CLINIC | Age: 37
Discharge: HOME | End: 2017-12-29
Attending: PHYSICIAN ASSISTANT | Admitting: PHYSICIAN ASSISTANT
Payer: COMMERCIAL

## 2017-12-29 DIAGNOSIS — R10.11 RUQ ABDOMINAL PAIN: ICD-10-CM

## 2017-12-29 PROCEDURE — 76705 ECHO EXAM OF ABDOMEN: CPT

## 2017-12-29 ASSESSMENT — ANXIETY QUESTIONNAIRES: GAD7 TOTAL SCORE: 4

## 2018-01-15 ENCOUNTER — OFFICE VISIT (OUTPATIENT)
Dept: FAMILY MEDICINE | Facility: CLINIC | Age: 38
End: 2018-01-15
Payer: COMMERCIAL

## 2018-01-15 VITALS
TEMPERATURE: 99.1 F | DIASTOLIC BLOOD PRESSURE: 60 MMHG | HEART RATE: 72 BPM | OXYGEN SATURATION: 97 % | HEIGHT: 65 IN | SYSTOLIC BLOOD PRESSURE: 100 MMHG | BODY MASS INDEX: 24.03 KG/M2 | WEIGHT: 144.2 LBS

## 2018-01-15 DIAGNOSIS — R19.7 DIARRHEA, UNSPECIFIED TYPE: Primary | ICD-10-CM

## 2018-01-15 PROCEDURE — 99213 OFFICE O/P EST LOW 20 MIN: CPT | Performed by: PHYSICIAN ASSISTANT

## 2018-01-15 NOTE — NURSING NOTE
"Chief Complaint   Patient presents with     RECHECK     follow up from DEc 28  - stomach issues         Initial /60 (BP Location: Right arm, Patient Position: Chair, Cuff Size: Adult Large)  Pulse 72  Temp 99.1  F (37.3  C) (Oral)  Ht 5' 5\" (1.651 m)  Wt 144 lb 3.2 oz (65.4 kg)  LMP 12/01/2017  SpO2 97%  BMI 24 kg/m2 Estimated body mass index is 24 kg/(m^2) as calculated from the following:    Height as of this encounter: 5' 5\" (1.651 m).    Weight as of this encounter: 144 lb 3.2 oz (65.4 kg).  Medication Reconciliation: complete      Health Maintenance addressed:  NONE    n/a      "

## 2018-01-15 NOTE — MR AVS SNAPSHOT
After Visit Summary   1/15/2018    Cely Mott    MRN: [id number]           Patient Information     Date Of Birth          1980        Visit Information        Provider Department      1/15/2018 11:45 AM Aaseby-Aguilera, Ramona Ann, PA-C [facility]        Today's Diagnoses     Diarrhea, unspecified type    -  1       Follow-ups after your visit        Your next 10 appointments already scheduled     Feb 01, 2018  1:00 PM CST   Return Visit with IVY Jane   [facility]    [address] [facility] [address] [phone]              Who to contact     If you have questions or need follow up information about today's clinic visit or your schedule please contact [facility] directly at [phone].  Normal or non-critical lab and imaging results will be communicated to you by MyChart, letter or phone within 4 business days after the clinic has received the results. If you do not hear from us within 7 days, please contact the clinic through MyChart or phone. If you have a critical or abnormal lab result, we will notify you by phone as soon as possible.  Submit refill requests through [employer] or call your pharmacy and they will forward the refill request to us. Please allow 3 business days for your refill to be completed.          Additional Information About Your Visit        MyChart Information     [employer] gives you secure access to your electronic health record. If you see a primary care provider, you can also send messages to your care team and make appointments. If you have questions, please call your primary care clinic.  If you do not have a primary care provider, please call [phone] and they will assist you.        Care EveryWhere ID     This is your Care EveryWhere ID. This could be used by other organizations to access your [facility] medical records  [id number]        Your Vitals Were      "Pulse Temperature Height Last Period Pulse Oximetry BMI (Body Mass Index)    72 99.1  F (37.3  C) (Oral) 5' 5\" (1.651 m) 12/01/2017 97% 24 kg/m2       Blood Pressure from Last 3 Encounters:   01/15/18 100/60   12/21/17 116/66   02/15/16 110/62    Weight from Last 3 Encounters:   01/15/18 144 lb 3.2 oz (65.4 kg)   12/21/17 141 lb (64 kg)   02/15/16 142 lb 8 oz (64.6 kg)              Today, you had the following     No orders found for display       Primary Care Provider Office Phone # Fax #    Ramona Ann Aaseby-Aguilera, PA-C [phone] [address] [facility] [address]        Equal Access to Services     [facility] : Hadii aad ku hadasho Soomaali, waaxda luqadaha, qaybta kaalmada adeegyada, cynthia ashley . So [facility] [phone].    ATENCIÓN: Si habla español, tiene a milton disposición servicios gratuitos de asistencia lingüística. Llame al [phone].    We comply with applicable federal civil rights laws and [place] laws. We do not discriminate on the basis of race, color, national origin, age, disability, sex, sexual orientation, or gender identity.            Thank you!     Thank you for choosing [facility]  for your care. Our goal is always to provide you with excellent care. Hearing back from our patients is one way we can continue to improve our services. Please take a few minutes to complete the written survey that you may receive in the mail after your visit with us. Thank you!             Your Updated Medication List - Protect others around you: Learn how to safely use, store and throw away your medicines at www.disposemymeds.org.          This list is accurate as of: 1/15/18 12:53 PM.  Always use your most recent med list.                   Brand Name Dispense Instructions for use Diagnosis    ACANYA 1.2-2.5 % Gel   Generic drug:  Clindamycin Phos-benzoyl Perox     50 g    Externally apply 0.5 inches topically daily Apply a pea sized " amount to each affected area of the face        omeprazole 40 MG capsule    priLOSEC    30 capsule    Take 1 capsule (40 mg) by mouth daily Take 30-60 minutes before a meal.    RUQ abdominal pain       PRENATAL VITAMINS PO           VITAMIN C ER PO      Take  by mouth.

## 2018-01-15 NOTE — PROGRESS NOTES
"  SUBJECTIVE:   Cely Mott is a 37 year old female who presents to clinic today for the following health issues:      Follow up on ultrasound: Cely was seen a few weeks ago for abdominal pain and diarrhea. At that time she had had a couple of sick kids with the [place] virus. She was put on omeprazole and had a right upper quadrant ultrasound to rule out gallstones. This ultrasound was within normal limits with no acute findings. She took the omeprazole for a couple of days and it caused her nose to get dry and crusty so stopped it and she's been fine ever since        Problem list and histories reviewed & adjusted, as indicated.  Additional history: as documented    Current Outpatient Prescriptions   Medication Sig Dispense Refill     Clindamycin Phos-benzoyl Perox (ACANYA) 1.2-2.5 % GEL Externally apply 0.5 inches topically daily Apply a pea sized amount to each affected area of the face 50 g 11     omeprazole (PRILOSEC) 40 MG capsule Take 1 capsule (40 mg) by mouth daily Take 30-60 minutes before a meal. 30 capsule 1     PRENATAL VITAMINS PO        Ascorbic Acid (VITAMIN C ER PO) Take  by mouth.       BP Readings from Last 3 Encounters:   01/15/18 100/60   12/21/17 116/66   02/15/16 110/62    Wt Readings from Last 3 Encounters:   01/15/18 144 lb 3.2 oz (65.4 kg)   12/21/17 141 lb (64 kg)   02/15/16 142 lb 8 oz (64.6 kg)                      Reviewed and updated as needed this visit by clinical staffTobacco  Allergies  Meds  Med Hx  Surg Hx  Fam Hx  Soc Hx      Reviewed and updated as needed this visit by Provider         ROS:  Constitutional, HEENT, cardiovascular, pulmonary, gi and gu systems are negative, except as otherwise noted.      OBJECTIVE:                                                    /60 (BP Location: Right arm, Patient Position: Chair, Cuff Size: Adult Large)  Pulse 72  Temp 99.1  F (37.3  C) (Oral)  Ht 5' 5\" (1.651 m)  Wt 144 lb 3.2 oz (65.4 kg)  LMP 12/01/2017  SpO2 " 97%  BMI 24 kg/m2  Body mass index is 24 kg/(m^2).  GENERAL APPEARANCE: healthy, alert and no distress  RESP: lungs clear to auscultation - no rales, rhonchi or wheezes  CV: regular rates and rhythm, normal S1 S2, no S3 or S4 and no murmur, click or rub  LYMPHATICS: normal ant/post cervical and supraclavicular nodes  ABDOMEN: soft, nontender, without hepatosplenomegaly or masses and bowel sounds normal    Diagnostic test results:  Diagnostic Test Results:  none        ASSESSMENT/PLAN:                                                      (R19.7) Diarrhea, unspecified type  (primary encounter diagnosis)  Comment:   Plan: resolved.          There are no Patient Instructions on file for this visit.    Ramona Ann Aaseby-Aguilera, PA-C  [facility]

## 2018-03-12 ENCOUNTER — OFFICE VISIT (OUTPATIENT)
Dept: PSYCHOLOGY | Facility: CLINIC | Age: 38
End: 2018-03-12
Payer: COMMERCIAL

## 2018-03-12 DIAGNOSIS — Z63.5 FAMILY DISRUPTION DUE TO DIVORCE OR LEGAL SEPARATION: ICD-10-CM

## 2018-03-12 DIAGNOSIS — F43.23 ADJUSTMENT DISORDER WITH MIXED ANXIETY AND DEPRESSED MOOD: Primary | ICD-10-CM

## 2018-03-12 PROCEDURE — 90834 PSYTX W PT 45 MINUTES: CPT | Performed by: MARRIAGE & FAMILY THERAPIST

## 2018-03-12 SDOH — SOCIAL STABILITY - SOCIAL INSECURITY: DISRUPTION OF FAMILY BY SEPARATION AND DIVORCE: Z63.5

## 2018-03-12 ASSESSMENT — ANXIETY QUESTIONNAIRES
6. BECOMING EASILY ANNOYED OR IRRITABLE: SEVERAL DAYS
1. FEELING NERVOUS, ANXIOUS, OR ON EDGE: SEVERAL DAYS
GAD7 TOTAL SCORE: 4
2. NOT BEING ABLE TO STOP OR CONTROL WORRYING: SEVERAL DAYS
7. FEELING AFRAID AS IF SOMETHING AWFUL MIGHT HAPPEN: NOT AT ALL
IF YOU CHECKED OFF ANY PROBLEMS ON THIS QUESTIONNAIRE, HOW DIFFICULT HAVE THESE PROBLEMS MADE IT FOR YOU TO DO YOUR WORK, TAKE CARE OF THINGS AT HOME, OR GET ALONG WITH OTHER PEOPLE: NOT DIFFICULT AT ALL
5. BEING SO RESTLESS THAT IT IS HARD TO SIT STILL: NOT AT ALL
3. WORRYING TOO MUCH ABOUT DIFFERENT THINGS: SEVERAL DAYS

## 2018-03-12 ASSESSMENT — PATIENT HEALTH QUESTIONNAIRE - PHQ9: 5. POOR APPETITE OR OVEREATING: NOT AT ALL
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
## 2018-03-12 NOTE — MR AVS SNAPSHOT
MRN:[id number]                      After Visit Summary   3/12/2018    Cely Mott    MRN: [id number]           Visit Information        Provider Department      3/12/2018 1:00 PM Renetta RussIVY ugarte [facility]      Your next 10 appointments already scheduled     Apr 09, 2018  1:00 PM CDT   Return Visit with IVY Jane   [facility]    [id number] [place] [id number] [phone]              MyChart Information     [employer] gives you secure access to your electronic health record. If you see a primary care provider, you can also send messages to your care team and make appointments. If you have questions, please call your primary care clinic.  If you do not have a primary care provider, please call [phone] and they will assist you.        Care EveryWhere ID     This is your Care EveryWhere ID. This could be used by other organizations to access your [facility] medical records  [id number]        Equal Access to Services     YEYO COLEMAN : Hadii tim ku hadasho Soomaali, waaxda luqadaha, qaybta kaalmada adeegyada, cynthia ashley . So [facility] [phone].    ATENCIÓN: Si habla español, tiene a milton disposición servicios gratuitos de asistencia lingüística. Llame al [phone].    We comply with applicable federal civil rights laws and [place] laws. We do not discriminate on the basis of race, color, national origin, age, disability, sex, sexual orientation, or gender identity.

## 2018-03-13 ASSESSMENT — ANXIETY QUESTIONNAIRES: GAD7 TOTAL SCORE: 4

## 2018-03-13 ASSESSMENT — PATIENT HEALTH QUESTIONNAIRE - PHQ9: SUM OF ALL RESPONSES TO PHQ QUESTIONS 1-9: 2

## 2018-04-09 ENCOUNTER — OFFICE VISIT (OUTPATIENT)
Dept: PSYCHOLOGY | Facility: CLINIC | Age: 38
End: 2018-04-09
Payer: COMMERCIAL

## 2018-04-09 DIAGNOSIS — Z63.5 FAMILY DISRUPTION DUE TO DIVORCE OR LEGAL SEPARATION: ICD-10-CM

## 2018-04-09 DIAGNOSIS — F43.23 ADJUSTMENT DISORDER WITH MIXED ANXIETY AND DEPRESSED MOOD: Primary | ICD-10-CM

## 2018-04-09 PROCEDURE — 90834 PSYTX W PT 45 MINUTES: CPT | Performed by: MARRIAGE & FAMILY THERAPIST

## 2018-04-09 SDOH — SOCIAL STABILITY - SOCIAL INSECURITY: DISRUPTION OF FAMILY BY SEPARATION AND DIVORCE: Z63.5

## 2018-04-09 ASSESSMENT — ANXIETY QUESTIONNAIRES
IF YOU CHECKED OFF ANY PROBLEMS ON THIS QUESTIONNAIRE, HOW DIFFICULT HAVE THESE PROBLEMS MADE IT FOR YOU TO DO YOUR WORK, TAKE CARE OF THINGS AT HOME, OR GET ALONG WITH OTHER PEOPLE: SOMEWHAT DIFFICULT
1. FEELING NERVOUS, ANXIOUS, OR ON EDGE: SEVERAL DAYS
2. NOT BEING ABLE TO STOP OR CONTROL WORRYING: NOT AT ALL
GAD7 TOTAL SCORE: 3
7. FEELING AFRAID AS IF SOMETHING AWFUL MIGHT HAPPEN: NOT AT ALL
5. BEING SO RESTLESS THAT IT IS HARD TO SIT STILL: NOT AT ALL
3. WORRYING TOO MUCH ABOUT DIFFERENT THINGS: SEVERAL DAYS
6. BECOMING EASILY ANNOYED OR IRRITABLE: NOT AT ALL

## 2018-04-09 ASSESSMENT — PATIENT HEALTH QUESTIONNAIRE - PHQ9: 5. POOR APPETITE OR OVEREATING: SEVERAL DAYS

## 2018-04-09 NOTE — Clinical Note
Jose Alejandro Zhang,  Patient has met therapy goals and completed treatment. She was discharged from therapy today 4/9/18.  She can return to counseling in the future as needed. Please contact me if you have any questions.  Russ

## 2018-04-09 NOTE — MR AVS SNAPSHOT
MRN:[id number]                      After Visit Summary   4/9/2018    Cely Mott    MRN: [id number]           Visit Information        Provider Department      4/9/2018 1:00 PM Russ Jackson LMFT [facility] DISCHARGE      MyChart Information     MyChart gives you secure access to your electronic health record. If you see a primary care provider, you can also send messages to your care team and make appointments. If you have questions, please call your primary care clinic.  If you do not have a primary care provider, please call [phone] and they will assist you.        Care EveryWhere ID     This is your Care EveryWhere ID. This could be used by other organizations to access your [place] medical records  [id number]        Equal Access to Services     YEYO COLEMAN : Karthik Guardado, waaxda luqadaha, qaybta kaalmada mary, cynthia bartholomew. So [facility] [phone].    ATENCIÓN: Si habla español, tiene a milton disposición servicios gratuitos de asistencia lingüística. Llame al [phone].    We comply with applicable federal civil rights laws and [place] laws. We do not discriminate on the basis of race, color, national origin, age, disability, sex, sexual orientation, or gender identity.
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
## 2018-04-10 ASSESSMENT — PATIENT HEALTH QUESTIONNAIRE - PHQ9: SUM OF ALL RESPONSES TO PHQ QUESTIONS 1-9: 2

## 2018-04-10 ASSESSMENT — ANXIETY QUESTIONNAIRES: GAD7 TOTAL SCORE: 3

## 2019-02-15 ENCOUNTER — HEALTH MAINTENANCE LETTER (OUTPATIENT)
Age: 39
End: 2019-02-15

## 2020-02-24 ENCOUNTER — HEALTH MAINTENANCE LETTER (OUTPATIENT)
Age: 40
End: 2020-02-24

## 2020-12-13 ENCOUNTER — HEALTH MAINTENANCE LETTER (OUTPATIENT)
Age: 40
End: 2020-12-13

## 2021-04-17 ENCOUNTER — HEALTH MAINTENANCE LETTER (OUTPATIENT)
Age: 41
End: 2021-04-17

## 2021-09-26 ENCOUNTER — HEALTH MAINTENANCE LETTER (OUTPATIENT)
Age: 41
End: 2021-09-26

## 2021-09-27 NOTE — PROGRESS NOTES
SUBJECTIVE:   Cely Mott is a 37 year old female who presents to clinic today for the following health issues:    Over the weekend had a cold and was taking tylenol sinus and flu.  Sunday night developed abdominal pain.   No nausea or diarrhea.  Felt light headed but went away after nap.  Since  Then has continued with abdominal pain  And at its worse would rate around a 6 on Sunday but better since then comes and goes. Went out to dinner las night and had buttered noodles and chicken and had abdominal pain   Abdominal Pain, at times tight chest on left side      Duration: x 4 days    Description (location/character/radiation): mid abdomen       Associated flank pain: tight    Intensity:  6/10    Accompanying signs and symptoms:        Fever/Chills: no        Gas/Bloating: YES- gas       Nausea/vomitting: no        Diarrhea: no        Dysuria or Hematuria: no     History (previous similar pain/trauma/previous testing): no    Precipitating or alleviating factors:       Pain worse with eating/BM/urination: eating makes it worse at times       Pain relieved by BM: no     Therapies tried and outcome: None    LMP:  12/1/2017            Problem list and histories reviewed & adjusted, as indicated.  Additional history: as documented    Current Outpatient Prescriptions   Medication Sig Dispense Refill     Clindamycin Phos-benzoyl Perox (ACANYA) 1.2-2.5 % GEL Externally apply 0.5 inches topically daily Apply a pea sized amount to each affected area of the face 50 g 11     omeprazole (PRILOSEC) 40 MG capsule Take 1 capsule (40 mg) by mouth daily Take 30-60 minutes before a meal. 30 capsule 1     PRENATAL VITAMINS PO        Ascorbic Acid (VITAMIN C ER PO) Take  by mouth.       BP Readings from Last 3 Encounters:   12/21/17 116/66   02/15/16 110/62   03/02/15 110/66    Wt Readings from Last 3 Encounters:   12/21/17 141 lb (64 kg)   02/15/16 142 lb 8 oz (64.6 kg)   03/02/15 149 lb (67.6 kg)                      Reviewed  Subjective       The patient is a 62-year-old male with history of chronic neck and low back pain.  The patient had 2 lumbar surgeries in the past.  He states that over the past week he has been getting so progressively worse that he is unable to walk from his chair to the bathroom.  He has severe bilateral leg weakness, the right is worse.  He has right arm weakness and unable to get it up.  The patient presented to [facility].  He was admitted to general medical floor for further evaluation and therapy.  The patient was started on pain medications, supportive therapy. He was seen by a neurologist.  The patient had MRI thoracic and cervical spine repeated; it showed severe cervical cord compression and central stenosis at C5-C6 similar to prior exam and extensive multilevel neural foraminal stenosis. The patient was reevaluated by a neurosurgeon.  He has h/o advanced COPD; he had CT chest done; he has trace effusions and patchy bibasilar atelectasis. He was seen by a pulmonologist. The patient has neuromuscular weakness of unknown etiology that developed after COVID-19 back in March 2020 progressively getting worse and impacting his respiratory system.   The patient may progress to the point when he needs intubation and tracheostomy placement. The patient does not need any intervention per neurosurgery.   Patient had LP done that showed elevated protein level supporting diagnosis of CIDP.  The patient started plasmapheresis on 09/14/21; he was getting it every other day x 5 days; he completed the course.  So far, no signs of meaningful improvement.  Patient will continue extensive PT.  D/w a neurology to possibly transfer him to the [facility]. Neurology discussed with different hospitals; however, no bed so far available.  Neurology reviewed the case with a [facility]; they said nothing else can be added.  The patient will be planned for NH placement.  Continue current  "and updated as needed this visit by clinical staff     Reviewed and updated as needed this visit by Provider         ROS:  Constitutional, HEENT, cardiovascular, pulmonary, gi and gu systems are negative, except as otherwise noted.      OBJECTIVE:                                                    /66 (BP Location: Right arm, Patient Position: Chair, Cuff Size: Adult Regular)  Pulse 64  Temp 98.7  F (37.1  C) (Oral)  Resp 16  Ht 5' 5\" (1.651 m)  Wt 141 lb (64 kg)  LMP 12/01/2017  SpO2 98%  Breastfeeding? No  BMI 23.46 kg/m2  Body mass index is 23.46 kg/(m^2).  GENERAL APPEARANCE: healthy, alert and no distress  RESP: lungs clear to auscultation - no rales, rhonchi or wheezes  CV: regular rates and rhythm, normal S1 S2, no S3 or S4 and no murmur, click or rub  LYMPHATICS: normal ant/post cervical and supraclavicular nodes  ABDOMEN: soft, TTP in epigastric and RUQ.  murphys positive, without hepatosplenomegaly or masses and bowel sounds normal  MS: extremities normal- no gross deformities noted    Diagnostic test results:  Diagnostic Test Results:  none        ASSESSMENT/PLAN:                                                    1. Adjustment disorder with mixed anxiety and depressed mood  stable    2. RUQ abdominal pain  ? Viral process vs gallbalder vs gasric ulcer?  Advised to take omeprazole 40 mg in the a.m. 30 minutes before eating. Will get an abdominal ultrasound to rule out gallstones and requested follow-up in 2-3 weeks unless symptoms are worsening then return to clinic right away  - omeprazole (PRILOSEC) 40 MG capsule; Take 1 capsule (40 mg) by mouth daily Take 30-60 minutes before a meal.  Dispense: 30 capsule; Refill: 1  - US Abdomen Limited; Future    3. Atypical chest pain  Not thinking that this is cardiac related but got an EKG that showed normal sinus rhythm and no abnormalities  - EKG 12-lead complete w/read - Clinics      Patient Instructions   (F43.23) Adjustment disorder with mixed " mngt.        Objective     I/O's    Intake/Output Summary (Last 24 hours) at 9/27/2021 1333  Last data filed at 9/27/2021 0600  Gross per 24 hour   Intake 320 ml   Output 2050 ml   Net -1730 ml       Last Recorded Vitals  Blood pressure 133/85, pulse 79, temperature 98.1 °F (36.7 °C), temperature source Oral, resp. rate 16, height 6' 4\" (1.93 m), weight 111.9 kg (246 lb 11.1 oz), SpO2 98 %.  Body mass index is 30.03 kg/m².      General:  A&O x 3  Skin:  Warm and dry without rash.    Head:  Normocephalic-atraumatic. Chronic L facial nerve palsy  Neck:  Trachea is midline.  Severe cervical muscle spasm and tenderness.   Eyes:  Normal conjunctivae and sclerae.   ENT:  Mucous membranes are moist.   Cardiovascular:  Symmetrical pulses.  Regular rate and rhythm without murmur.  Respiratory:   Normal respiratory effort. Very diminished bronchial BS  Gastrointestinal:  Soft and nontender.  Normal bowel sounds.  No hepatomegaly or splenomegaly.   Musculoskeletal:  No deformity or edema. There is very limited ROM in his right shoulder; Left upper extremity 4 out of 5 shoulder abduction 4 out of 5 elbow flexion 3 out of 5 distal strength  Left lower extremity 2 out of 5 hip flexion 2 out of 5 knee flexion extension 0 out of 5 dorsiflexion  Right upper extremity 3 out of 5 shoulder abduction 4 out of 5 elbow flexion 3 out of 5 distal strength  Right lower extremity 2 out of 5 hip flexion 2 out of 5 knee extension; 0 out of 5 dorsiflexion  Back:  Normal alignment.  No costovertebral angle tenderness.  Psychiatric:  Cooperative.  Appropriate mood and affect.      Labs   Today’s Results:    Recent Labs   Lab 09/27/21 0417 09/25/21 0425   WBC 7.7 9.5   HCT 37.4* 38.4*   HGB 11.8* 12.6*    235     Recent Labs   Lab 09/27/21 0417 09/25/21 0425   SODIUM 140 142   POTASSIUM 3.8 3.6   CHLORIDE 107 109*   CO2 30 31   GLUCOSE 100* 104*   BUN 16 16   CREATININE 0.47* 0.39*     CT CHEST:    Impression:     1.    Trace effusions  and patchy bibasilar atelectasis versus pneumonia.   Findings are significantly improved from 10/05/2020 CT scan.   2.    Advanced coronary atherosclerosis and cardiomegaly.      THORACIC SPINE MRI:    Impression:     1.   Slight scoliosis with no fracture, malalignment or suspicious osseous   lesions.   2.   Early degenerative changes at T3-T4 and T11-L1 with no large disc   extrusion, cord compression or significant central stenosis.   3.   Thoracic spinal cord unremarkable.      MRI CERVICAL SPINE WO CONTRAST    Impression:     1.   Exam limited by repetitive patient motion artifact.   2.   Severe multifactorial chronic cord compression and central stenosis at   C5-C6 similar to prior.   3.   Moderate to severe multifactorial chronic cord compression at C4-C5   also not significantly changed.   4.   Milder cord compression and central stenosis at C3-C4 and C6-C7.   5.   Extensive multilevel neural foraminal stenosis detailed above.   6.   Slight reversal of normal cervical lordosis and noted with no new   fracture, malalignment or suspicious osseous lesions.   7.   Slightly limited evaluation of cervical spinal cord shows no evidence   of focal lesion, syrinx or myelomalacia.        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   Progressive neuromuscular weakness disorder: Post-infectious autoimmune disease is suspected, such as inflammatory polyneuropathy (CIDP) vs myopathy vs motor neuron disease (unusual to present and progress so rapidly, exam findings are not suggestive of this diagnosis) vs myasthenia gravis (less likely)  Hypertension  Hyperlipidemia  Hypothyroidism   JARROD  Chronic L facial nerve palsy  Chronic low back pain s/p multiple lumbar surgeries   Mixed connective tissue disease  COPD, shortness of breath; h/o COVID-19        DVT Prophylaxis  SCD    Smoking Cessation  Quit smoking 20 years ago; he has 40 p/y h/o smoking        Dillon Martin MD       anxiety and depressed mood  (primary encounter diagnosis)  Comment:   Plan:     (R10.11) RUQ abdominal pain  Comment: ? Etiology?  Gallstone    Plan: omeprazole (PRILOSEC) 40 MG capsule, US Abdomen        Limited            (R07.89) Atypical chest pain  Comment:   Plan: EKG 12-lead complete w/read - Clinics                  Ramona Ann Aaseby-Aguilera, PA-C  [facility]

## 2021-10-06 ENCOUNTER — TRANSFERRED RECORDS (OUTPATIENT)
Dept: HEALTH INFORMATION MANAGEMENT | Facility: CLINIC | Age: 41
End: 2021-10-06

## 2021-10-06 LAB — PAP SMEAR - HIM PATIENT REPORTED: NEGATIVE

## 2021-10-20 ENCOUNTER — OFFICE VISIT (OUTPATIENT)
Dept: FAMILY MEDICINE | Facility: CLINIC | Age: 41
End: 2021-10-20
Payer: COMMERCIAL

## 2021-10-20 VITALS
DIASTOLIC BLOOD PRESSURE: 62 MMHG | OXYGEN SATURATION: 100 % | RESPIRATION RATE: 16 BRPM | HEART RATE: 70 BPM | TEMPERATURE: 98.1 F | SYSTOLIC BLOOD PRESSURE: 110 MMHG | WEIGHT: 157 LBS | BODY MASS INDEX: 26.16 KG/M2 | HEIGHT: 65 IN

## 2021-10-20 DIAGNOSIS — Z00.00 ROUTINE GENERAL MEDICAL EXAMINATION AT A HEALTH CARE FACILITY: ICD-10-CM

## 2021-10-20 DIAGNOSIS — Z11.59 NEED FOR HEPATITIS C SCREENING TEST: ICD-10-CM

## 2021-10-20 DIAGNOSIS — Z13.220 SCREENING, LIPID: Primary | ICD-10-CM

## 2021-10-20 DIAGNOSIS — Z13.29 SCREENING FOR THYROID DISORDER: ICD-10-CM

## 2021-10-20 DIAGNOSIS — Z13.0 SCREENING FOR BLOOD DISEASE: ICD-10-CM

## 2021-10-20 DIAGNOSIS — Z13.1 SCREENING FOR DIABETES MELLITUS: ICD-10-CM

## 2021-10-20 PROCEDURE — 99385 PREV VISIT NEW AGE 18-39: CPT | Performed by: PHYSICIAN ASSISTANT

## 2021-10-20 RX ORDER — DAPSONE 50 MG/G
GEL TOPICAL
COMMUNITY
Start: 2021-06-22

## 2021-10-20 ASSESSMENT — ENCOUNTER SYMPTOMS
CHILLS: 0
HEARTBURN: 0
DYSURIA: 0
COUGH: 0
SHORTNESS OF BREATH: 0
ABDOMINAL PAIN: 0
CONSTIPATION: 0
HEMATURIA: 0
EYE PAIN: 0
MYALGIAS: 0
ARTHRALGIAS: 0
NAUSEA: 0
WEAKNESS: 0
DIZZINESS: 0
HEADACHES: 0
NERVOUS/ANXIOUS: 0
FEVER: 0
PARESTHESIAS: 0
BREAST MASS: 0
FREQUENCY: 0
JOINT SWELLING: 0
DIARRHEA: 0
HEMATOCHEZIA: 0
SORE THROAT: 0
PALPITATIONS: 0

## 2021-10-20 ASSESSMENT — MIFFLIN-ST. JEOR: SCORE: 1388.03

## 2021-10-20 NOTE — Clinical Note
Please abstract the following data from this visit with this patient into the appropriate field in Epic:    Tests that can be patient reported without a hard copy:    Pap smear done on this date: 10/07/2021  by this group: [facility], results were negative.     Other Tests found in the patient's chart through Chart Review/Care Everywhere:        Note to Abstraction: If this section is blank, no results were found via Chart Review/Care Everywhere.

## 2021-10-20 NOTE — PROGRESS NOTES
SUBJECTIVE:   CC: Cely Mott is an 41 year old woman who presents for preventive health visit.     Patient has been advised of split billing requirements and indicates understanding: Yes  Healthy Habits:     Getting at least 3 servings of Calcium per day:  Yes    Bi-annual eye exam:  Yes    Dental care twice a year:  Yes    Sleep apnea or symptoms of sleep apnea:  None    Diet:  Regular (no restrictions)    Frequency of exercise:  2-3 days/week    Duration of exercise:  45-60 minutes    Taking medications regularly:  Yes    Medication side effects:  None    PHQ-2 Total Score: 0    Additional concerns today:  No        Today's PHQ-2 Score:   PHQ-2 ( 1999 Pfizer) 10/20/2021   Q1: Little interest or pleasure in doing things 0   Q2: Feeling down, depressed or hopeless 0   PHQ-2 Score 0   Q1: Little interest or pleasure in doing things Not at all   Q2: Feeling down, depressed or hopeless Not at all   PHQ-2 Score 0       Abuse: Current or Past (Physical, Sexual or Emotional) - No  Do you feel safe in your environment? Yes    Have you ever done Advance Care Planning? (For example, a Health Directive, POLST, or a discussion with a medical provider or your loved ones about your wishes): No, advance care planning information given to patient to review.  Patient plans to discuss their wishes with loved ones or provider.      Social History     Tobacco Use     Smoking status: Never Smoker     Smokeless tobacco: Never Used   Substance Use Topics     Alcohol use: No     Comment: not w/pregnancy       Alcohol Use 10/20/2021   Prescreen: >3 drinks/day or >7 drinks/week? No     Reviewed orders with patient.  Reviewed health maintenance and updated orders accordingly - Yes  BP Readings from Last 3 Encounters:   10/20/21 110/62   01/15/18 100/60   12/21/17 116/66    Wt Readings from Last 3 Encounters:   10/20/21 71.2 kg (157 lb)   01/15/18 65.4 kg (144 lb 3.2 oz)   12/21/17 64 kg (141 lb)                    Breast Cancer  "Screening:  Any new diagnosis of family breast, ovarian, or bowel cancer? No    FHS-7: No flowsheet data found.    Patient under 40 years of age: Routine Mammogram Screening not recommended.   Pertinent mammograms are reviewed under the imaging tab.    History of abnormal Pap smear: NO - age 30- 65 PAP every 3 years recommended     Reviewed and updated as needed this visit by clinical staff  Tobacco  Allergies  Meds   Med Hx  Surg Hx  Fam Hx  Soc Hx        Reviewed and updated as needed this visit by Provider                    Review of Systems   Constitutional: Negative for chills and fever.   HENT: Negative for congestion, ear pain, hearing loss and sore throat.    Eyes: Negative for pain and visual disturbance.   Respiratory: Negative for cough and shortness of breath.    Cardiovascular: Negative for chest pain, palpitations and peripheral edema.   Gastrointestinal: Negative for abdominal pain, constipation, diarrhea, heartburn, hematochezia and nausea.   Breasts:  Negative for tenderness, breast mass and discharge.   Genitourinary: Negative for dysuria, frequency, genital sores, hematuria, pelvic pain, urgency, vaginal bleeding and vaginal discharge.   Musculoskeletal: Negative for arthralgias, joint swelling and myalgias.   Skin: Negative for rash.   Neurological: Negative for dizziness, weakness, headaches and paresthesias.   Psychiatric/Behavioral: Negative for mood changes. The patient is not nervous/anxious.           OBJECTIVE:   /62   Pulse 70   Temp 98.1  F (36.7  C) (Oral)   Resp 16   Ht 1.651 m (5' 5\")   Wt 71.2 kg (157 lb)   LMP 10/11/2021 (Approximate)   SpO2 100%   BMI 26.13 kg/m    Physical Exam  GENERAL: healthy, alert and no distress  EYES: Eyes grossly normal to inspection, PERRL and conjunctivae and sclerae normal  HENT: ear canals and TM's normal, nose and mouth without ulcers or lesions  NECK: no adenopathy, no asymmetry, masses, or scars and thyroid normal to " "palpation  RESP: lungs clear to auscultation - no rales, rhonchi or wheezes  BREAST: normal without masses, tenderness or nipple discharge and no palpable axillary masses or adenopathy  CV: regular rate and rhythm, normal S1 S2, no S3 or S4, no murmur, click or rub, no peripheral edema and peripheral pulses strong  ABDOMEN: soft, nontender, no hepatosplenomegaly, no masses and bowel sounds normal  MS: no gross musculoskeletal defects noted, no edema  SKIN: no suspicious lesions or rashes  NEURO: Normal strength and tone, mentation intact and speech normal  PSYCH: mentation appears normal, affect normal/bright  LYMPH: no cervical, supraclavicular, axillary, or inguinal adenopathy    Diagnostic Test Results:  Labs reviewed in Epic    ASSESSMENT/PLAN:   (Z13.220) Screening, lipid  (primary encounter diagnosis)  Comment:   Plan: Lipid Profile            (Z11.59) Need for hepatitis C screening test  Comment:   Plan: Hepatitis C Screen Reflex to HCV RNA Quant and         Genotype            (Z00.00) Routine general medical examination at a health care facility  Comment:   Plan:     (Z13.1) Screening for diabetes mellitus  Comment:   Plan: Comprehensive metabolic panel            (Z13.0) Screening for blood disease  Comment:   Plan: CBC with platelets            (Z13.29) Screening for thyroid disorder  Comment:   Plan: TSH with free T4 reflex              Patient has been advised of split billing requirements and indicates understanding: Yes  COUNSELING:  Reviewed preventive health counseling, as reflected in patient instructions       Regular exercise       Healthy diet/nutrition       Vision screening       Hearing screening    Estimated body mass index is 26.13 kg/m  as calculated from the following:    Height as of this encounter: 1.651 m (5' 5\").    Weight as of this encounter: 71.2 kg (157 lb).        She reports that she has never smoked. She has never used smokeless tobacco.      Counseling Resources:  ATP IV " Guidelines  Pooled Cohorts Equation Calculator  Breast Cancer Risk Calculator  BRCA-Related Cancer Risk Assessment: FHS-7 Tool  FRAX Risk Assessment  ICSI Preventive Guidelines  Dietary Guidelines for Americans, 2010  USDA's MyPlate  ASA Prophylaxis  Lung CA Screening    Ramona Ann Aaseby-Aguilera, PA-C  [facility]

## 2021-10-26 ENCOUNTER — LAB (OUTPATIENT)
Dept: LAB | Facility: CLINIC | Age: 41
End: 2021-10-26
Payer: COMMERCIAL

## 2021-10-26 DIAGNOSIS — Z13.220 SCREENING, LIPID: ICD-10-CM

## 2021-10-26 DIAGNOSIS — Z13.0 SCREENING FOR BLOOD DISEASE: ICD-10-CM

## 2021-10-26 DIAGNOSIS — Z13.1 SCREENING FOR DIABETES MELLITUS: ICD-10-CM

## 2021-10-26 DIAGNOSIS — Z11.59 NEED FOR HEPATITIS C SCREENING TEST: ICD-10-CM

## 2021-10-26 DIAGNOSIS — Z13.29 SCREENING FOR THYROID DISORDER: ICD-10-CM

## 2021-10-26 LAB
ALBUMIN SERPL-MCNC: 3.8 G/DL (ref 3.4–5)
ALP SERPL-CCNC: 45 U/L (ref 40–150)
ALT SERPL W P-5'-P-CCNC: 21 U/L (ref 0–50)
ANION GAP SERPL CALCULATED.3IONS-SCNC: 5 MMOL/L (ref 3–14)
AST SERPL W P-5'-P-CCNC: 7 U/L (ref 0–45)
BILIRUB SERPL-MCNC: 0.5 MG/DL (ref 0.2–1.3)
BUN SERPL-MCNC: 10 MG/DL (ref 7–30)
CALCIUM SERPL-MCNC: 8.8 MG/DL (ref 8.5–10.1)
CHLORIDE BLD-SCNC: 105 MMOL/L (ref 94–109)
CHOLEST SERPL-MCNC: 173 MG/DL
CO2 SERPL-SCNC: 27 MMOL/L (ref 20–32)
CREAT SERPL-MCNC: 0.71 MG/DL (ref 0.52–1.04)
ERYTHROCYTE [DISTWIDTH] IN BLOOD BY AUTOMATED COUNT: 12.7 % (ref 10–15)
FASTING STATUS PATIENT QL REPORTED: YES
GFR SERPL CREATININE-BSD FRML MDRD: >90 ML/MIN/1.73M2
GLUCOSE BLD-MCNC: 96 MG/DL (ref 70–99)
HCT VFR BLD AUTO: 43 % (ref 35–47)
HDLC SERPL-MCNC: 62 MG/DL
HGB BLD-MCNC: 14.5 G/DL (ref 11.7–15.7)
LDLC SERPL CALC-MCNC: 99 MG/DL
MCH RBC QN AUTO: 29.6 PG (ref 26.5–33)
MCHC RBC AUTO-ENTMCNC: 33.7 G/DL (ref 31.5–36.5)
MCV RBC AUTO: 88 FL (ref 78–100)
NONHDLC SERPL-MCNC: 111 MG/DL
PLATELET # BLD AUTO: 237 10E3/UL (ref 150–450)
POTASSIUM BLD-SCNC: 4.4 MMOL/L (ref 3.4–5.3)
PROT SERPL-MCNC: 7.2 G/DL (ref 6.8–8.8)
RBC # BLD AUTO: 4.9 10E6/UL (ref 3.8–5.2)
SODIUM SERPL-SCNC: 137 MMOL/L (ref 133–144)
TRIGL SERPL-MCNC: 61 MG/DL
TSH SERPL DL<=0.005 MIU/L-ACNC: 1.59 MU/L (ref 0.4–4)
WBC # BLD AUTO: 4.2 10E3/UL (ref 4–11)

## 2021-10-26 PROCEDURE — 80061 LIPID PANEL: CPT

## 2021-10-26 PROCEDURE — 85027 COMPLETE CBC AUTOMATED: CPT

## 2021-10-26 PROCEDURE — 36415 COLL VENOUS BLD VENIPUNCTURE: CPT

## 2021-10-26 PROCEDURE — 84443 ASSAY THYROID STIM HORMONE: CPT

## 2021-10-26 PROCEDURE — 86803 HEPATITIS C AB TEST: CPT

## 2021-10-26 PROCEDURE — 80053 COMPREHEN METABOLIC PANEL: CPT

## 2021-10-27 LAB — HCV AB SERPL QL IA: NONREACTIVE

## 2022-02-25 ENCOUNTER — APPOINTMENT (OUTPATIENT)
Dept: GENERAL RADIOLOGY | Facility: CLINIC | Age: 42
End: 2022-02-25
Attending: EMERGENCY MEDICINE
Payer: COMMERCIAL

## 2022-02-25 ENCOUNTER — HOSPITAL ENCOUNTER (EMERGENCY)
Facility: CLINIC | Age: 42
Discharge: HOME | End: 2022-02-26
Attending: EMERGENCY MEDICINE | Admitting: EMERGENCY MEDICINE
Payer: COMMERCIAL

## 2022-02-25 DIAGNOSIS — M79.661 PAIN OF RIGHT CALF: ICD-10-CM

## 2022-02-25 PROCEDURE — 73590 X-RAY EXAM OF LOWER LEG: CPT | Mod: RT

## 2022-02-25 PROCEDURE — 250N000013 HC RX MED GY IP 250 OP 250 PS 637: Performed by: EMERGENCY MEDICINE

## 2022-02-25 PROCEDURE — 99283 EMERGENCY DEPT VISIT LOW MDM: CPT

## 2022-02-25 RX ORDER — OXYCODONE AND ACETAMINOPHEN 5; 325 MG/1; MG/1
1 TABLET ORAL ONCE
Status: COMPLETED | OUTPATIENT
Start: 2022-02-25 | End: 2022-02-25

## 2022-02-25 RX ORDER — OXYCODONE AND ACETAMINOPHEN 5; 325 MG/1; MG/1
1 TABLET ORAL EVERY 6 HOURS PRN
Qty: 12 TABLET | Refills: 0 | Status: SHIPPED | OUTPATIENT
Start: 2022-02-25 | End: 2022-02-28

## 2022-02-25 RX ADMIN — OXYCODONE HYDROCHLORIDE AND ACETAMINOPHEN 1 TABLET: 5; 325 TABLET ORAL at 23:17

## 2022-02-25 ASSESSMENT — ENCOUNTER SYMPTOMS: MYALGIAS: 1

## 2022-02-26 VITALS
DIASTOLIC BLOOD PRESSURE: 77 MMHG | OXYGEN SATURATION: 100 % | TEMPERATURE: 97 F | RESPIRATION RATE: 18 BRPM | HEART RATE: 80 BPM | SYSTOLIC BLOOD PRESSURE: 135 MMHG

## 2022-02-26 NOTE — ED PROVIDER NOTES
"  History   Chief Complaint:  Leg Pain and Swelling       The history is provided by the patient.      Cely Mott is a 41 year old female who presents with right leg pain and swelling. Earlier tonight, she was playing volleyball when she felt a \"pop\" and was followed by pain. She has not been able to ambulate since then. At bedside, she says the calf \"is getting tighter\". She has no pain over fibula or tibia.    Review of Systems   Musculoskeletal: Positive for myalgias (R Leg).   All other systems reviewed and are negative.    Allergies:  No Known Allergies    Medications:  Patient denies current use of medications.     Past Medical History:     Abnormal pap smear     Past Surgical History:    Cystectomy   Dilation and curettage   Thyroglossal duct cyst extraction   Cervical cyst extaction   Abdominal cyst extraction   R leg cyst extraction   Bartlett teeth extraction     Family History:    Mother - HTN, Lipids   Father - CAD, heart disease     Social History:  Patient presents to the ED with a family member.    Physical Exam     Patient Vitals for the past 24 hrs:   BP Temp Pulse Resp SpO2   02/25/22 2248 135/77 -- -- -- --   02/25/22 2247 -- 97  F (36.1  C) 83 16 99 %       Physical Exam  General/Appearance: appears stated age, well-groomed, appears mildly uncomfortable  Eyes: EOMI, no scleral injection, no icterus  ENT: MMM  Neck: supple, nl ROM, no stiffness  Cardiovascular: 2+ pulses in all 4 extremities, cap refill <2sec  Respiratory: no increased WOB  MSK: VEGA, good tone, no bony abnormality, no ttp over fibula/tibia, achilles tendon palpable, calf mm belly slightly larger  Skin: warm and well-perfused, no rash, no edema, no ecchymosis, nl turgor  Neuro: GCS 15, alert and oriented, no gross focal neuro deficits    Emergency Department Course   Imaging:   XR Tibia & Fibula Right 2 Views  Final Result  IMPRESSION: Normal tibia and fibula.    Per radiology     Emergency Department Course:    Reviewed:  I " reviewed nursing notes, vitals and past medical history    Assessments:  2257 I obtained history and examined the patient as noted above.   2350 I rechecked the patient     Interventions:  2317 Percocet 325 mg PO    Disposition:  The patient was discharged to home.     Impression & Plan   Medical Decision Making:  This patient is a 41-year-old female who felt a pop when she was playing volleyball tonight.  She since has had pain in her calf.  She still has an appreciable Achilles tendon but her calf does seem more full, raising concern for partial gastrocnemius tear.  X-rays were obtained which thankfully showed normal tibia and fibula.  The muscle belly still is soft so I am doubting something such as compartment syndrome.  At this point in time we have wrapped the calf and Ace wrap and are providing pain meds as well as crutches.  I have recommended she follow-up with [facility] tomorrow.    Diagnosis:    ICD-10-CM    1. Pain of right calf  M79.661        Discharge Medications:  New Prescriptions    OXYCODONE-ACETAMINOPHEN (PERCOCET) 5-325 MG TABLET    Take 1 tablet by mouth every 6 hours as needed for pain       Scribe Disclosure:  I, Paul Moore, am serving as a scribe at 10:57 PM on 2/25/2022 to document services personally performed by Bela Torres MD based on my observations and the provider's statements to me.        Bela Torres MD  02/25/22 6810

## 2022-07-26 ENCOUNTER — OFFICE VISIT (OUTPATIENT)
Dept: URGENT CARE | Facility: URGENT CARE | Age: 42
End: 2022-07-26
Payer: COMMERCIAL

## 2022-07-26 VITALS
RESPIRATION RATE: 16 BRPM | DIASTOLIC BLOOD PRESSURE: 70 MMHG | OXYGEN SATURATION: 99 % | HEART RATE: 68 BPM | SYSTOLIC BLOOD PRESSURE: 120 MMHG | BODY MASS INDEX: 26.06 KG/M2 | WEIGHT: 156.6 LBS | TEMPERATURE: 98.8 F

## 2022-07-26 DIAGNOSIS — H60.392 OTHER INFECTIVE ACUTE OTITIS EXTERNA OF LEFT EAR: Primary | ICD-10-CM

## 2022-07-26 PROCEDURE — 99213 OFFICE O/P EST LOW 20 MIN: CPT | Performed by: PHYSICIAN ASSISTANT

## 2022-07-26 RX ORDER — NEOMYCIN SULFATE, POLYMYXIN B SULFATE, HYDROCORTISONE 3.5; 10000; 1 MG/ML; [USP'U]/ML; MG/ML
3 SOLUTION/ DROPS AURICULAR (OTIC) 3 TIMES DAILY
Qty: 10 ML | Refills: 0 | Status: SHIPPED | OUTPATIENT
Start: 2022-07-26 | End: 2022-08-02

## 2022-07-26 NOTE — PROGRESS NOTES
Assessment & Plan     Other infective acute otitis externa of left ear  Cortisporin eardrops are prescribed today.  Advised to keep monitoring symptoms.  Follow-up if any worsening symptoms.  Patient agrees.  - neomycin-polymyxin-hydrocortisone (CORTISPORIN) 3.5-84685-6 otic solution  Dispense: 10 mL; Refill: 0         Return in about 1 week (around 8/2/2022) for Symptoms failing to improve.    Maribell Jackson PA-C  [facility] Ta is a 41 year old female who presents to clinic today for the following health issues:  Chief Complaint   Patient presents with     Otalgia     left     HPI    Patient is presenting to urgent care today with a complaint of left ear pain.  Ongoing symptoms for the past few days.  She notes she had URI symptoms last week.  No drainage from the left ear.  Treatment tried: Warm compresses.  No recent fevers or chills.  No cough or sore throat. Associated symptoms: decreased hearing from the left ear.      Review of Systems  Constitutional, HEENT, cardiovascular, pulmonary, GI, , musculoskeletal, neuro, skin, endocrine and psych systems are negative, except as otherwise noted.      Objective    /70 (BP Location: Right arm, Patient Position: Chair, Cuff Size: Adult Regular)   Pulse 68   Temp 98.8  F (37.1  C) (Oral)   Resp 16   Wt 71 kg (156 lb 9.6 oz)   SpO2 99%   Breastfeeding No   BMI 26.06 kg/m    Physical Exam   GENERAL: healthy, alert and no distress  HENT: Right ear canal and right TM normal, left TM is normal, left ear canal is edematous and erythematous, mouth without ulcers or lesions  RESP: lungs clear to auscultation - no rales, rhonchi or wheezes  CV: regular rate and rhythm, normal S1 S2  MS: no gross musculoskeletal defects noted, no edema

## 2022-10-07 ENCOUNTER — LAB REQUISITION (OUTPATIENT)
Dept: LAB | Facility: CLINIC | Age: 42
End: 2022-10-07

## 2022-10-07 DIAGNOSIS — Z12.4 ENCOUNTER FOR SCREENING FOR MALIGNANT NEOPLASM OF CERVIX: ICD-10-CM

## 2022-10-07 PROCEDURE — 87624 HPV HI-RISK TYP POOLED RSLT: CPT | Performed by: OBSTETRICS & GYNECOLOGY

## 2022-10-07 PROCEDURE — G0145 SCR C/V CYTO,THINLAYER,RESCR: HCPCS | Performed by: OBSTETRICS & GYNECOLOGY

## 2022-10-11 LAB
BKR LAB AP GYN ADEQUACY: NORMAL
BKR LAB AP GYN INTERPRETATION: NORMAL
BKR LAB AP HPV REFLEX: NORMAL
BKR LAB AP LMP: NORMAL
BKR LAB AP PREVIOUS ABNL DX: NORMAL
BKR LAB AP PREVIOUS ABNORMAL: NORMAL
PATH REPORT.COMMENTS IMP SPEC: NORMAL
PATH REPORT.COMMENTS IMP SPEC: NORMAL
PATH REPORT.RELEVANT HX SPEC: NORMAL

## 2022-10-13 LAB
HUMAN PAPILLOMA VIRUS 16 DNA: NEGATIVE
HUMAN PAPILLOMA VIRUS 18 DNA: NEGATIVE
HUMAN PAPILLOMA VIRUS FINAL DIAGNOSIS: ABNORMAL
HUMAN PAPILLOMA VIRUS OTHER HR: POSITIVE

## 2022-11-11 ENCOUNTER — LAB REQUISITION (OUTPATIENT)
Dept: LAB | Facility: CLINIC | Age: 42
End: 2022-11-11

## 2022-11-11 DIAGNOSIS — R87.619 UNSPECIFIED ABNORMAL CYTOLOGICAL FINDINGS IN SPECIMENS FROM CERVIX UTERI: ICD-10-CM

## 2022-11-11 PROCEDURE — 88305 TISSUE EXAM BY PATHOLOGIST: CPT | Performed by: PATHOLOGY

## 2022-11-15 LAB
PATH REPORT.COMMENTS IMP SPEC: NORMAL
PATH REPORT.COMMENTS IMP SPEC: NORMAL
PATH REPORT.FINAL DX SPEC: NORMAL
PATH REPORT.GROSS SPEC: NORMAL
PATH REPORT.MICROSCOPIC SPEC OTHER STN: NORMAL
PATH REPORT.RELEVANT HX SPEC: NORMAL
PHOTO IMAGE: NORMAL

## 2023-01-14 ENCOUNTER — HEALTH MAINTENANCE LETTER (OUTPATIENT)
Age: 43
End: 2023-01-14

## 2023-04-24 ENCOUNTER — MEDICAL CORRESPONDENCE (OUTPATIENT)
Dept: HEALTH INFORMATION MANAGEMENT | Facility: CLINIC | Age: 43
End: 2023-04-24
Payer: COMMERCIAL

## 2023-05-09 DIAGNOSIS — Z01.818 EXAMINATION PRIOR TO CHEMOTHERAPY: ICD-10-CM

## 2023-05-09 DIAGNOSIS — C50.919 BREAST CANCER (H): Primary | ICD-10-CM

## 2023-05-17 ENCOUNTER — HOSPITAL ENCOUNTER (OUTPATIENT)
Dept: CARDIOLOGY | Facility: CLINIC | Age: 43
Discharge: HOME | End: 2023-05-17
Attending: INTERNAL MEDICINE | Admitting: INTERNAL MEDICINE
Payer: COMMERCIAL

## 2023-05-17 DIAGNOSIS — C50.919 BREAST CANCER (H): ICD-10-CM

## 2023-05-17 DIAGNOSIS — Z01.818 EXAMINATION PRIOR TO CHEMOTHERAPY: ICD-10-CM

## 2023-05-17 LAB
BI-PLANE LVEF ECHO: NORMAL
LVEF ECHO: NORMAL

## 2023-05-17 PROCEDURE — 255N000002 HC RX 255 OP 636: Performed by: INTERNAL MEDICINE

## 2023-05-17 PROCEDURE — 93306 TTE W/DOPPLER COMPLETE: CPT | Mod: 26 | Performed by: INTERNAL MEDICINE

## 2023-05-17 PROCEDURE — 999N000208 ECHOCARDIOGRAM COMPLETE

## 2023-05-17 RX ADMIN — HUMAN ALBUMIN MICROSPHERES AND PERFLUTREN 6 ML: 10; .22 INJECTION, SOLUTION INTRAVENOUS at 08:07

## 2023-07-28 ENCOUNTER — ANCILLARY ORDERS (OUTPATIENT)
Dept: CARDIOLOGY | Facility: CLINIC | Age: 43
End: 2023-07-28

## 2023-07-28 DIAGNOSIS — Z51.81 ENCOUNTER FOR THERAPEUTIC DRUG MONITORING: Primary | ICD-10-CM

## 2023-08-07 ENCOUNTER — ANCILLARY ORDERS (OUTPATIENT)
Dept: CARDIOLOGY | Facility: CLINIC | Age: 43
End: 2023-08-07

## 2023-08-07 ENCOUNTER — HOSPITAL ENCOUNTER (OUTPATIENT)
Dept: CARDIOLOGY | Facility: CLINIC | Age: 43
Discharge: HOME | End: 2023-08-07
Attending: INTERNAL MEDICINE | Admitting: INTERNAL MEDICINE
Payer: COMMERCIAL

## 2023-08-07 DIAGNOSIS — Z51.81 ENCOUNTER FOR THERAPEUTIC DRUG MONITORING: Primary | ICD-10-CM

## 2023-08-07 DIAGNOSIS — Z51.81 ENCOUNTER FOR THERAPEUTIC DRUG MONITORING: ICD-10-CM

## 2023-08-07 LAB — LVEF ECHO: NORMAL

## 2023-08-07 PROCEDURE — 93325 DOPPLER ECHO COLOR FLOW MAPG: CPT | Mod: 26 | Performed by: INTERNAL MEDICINE

## 2023-08-07 PROCEDURE — 93321 DOPPLER ECHO F-UP/LMTD STD: CPT | Mod: 26 | Performed by: INTERNAL MEDICINE

## 2023-08-07 PROCEDURE — 93308 TTE F-UP OR LMTD: CPT | Mod: 26 | Performed by: INTERNAL MEDICINE

## 2023-08-07 PROCEDURE — 93356 MYOCRD STRAIN IMG SPCKL TRCK: CPT

## 2023-08-07 PROCEDURE — 93356 MYOCRD STRAIN IMG SPCKL TRCK: CPT | Performed by: INTERNAL MEDICINE

## 2023-08-07 PROCEDURE — 93325 DOPPLER ECHO COLOR FLOW MAPG: CPT

## 2023-09-07 ENCOUNTER — PATIENT OUTREACH (OUTPATIENT)
Dept: CARE COORDINATION | Facility: CLINIC | Age: 43
End: 2023-09-07
Payer: COMMERCIAL

## 2023-09-21 ENCOUNTER — PATIENT OUTREACH (OUTPATIENT)
Dept: CARE COORDINATION | Facility: CLINIC | Age: 43
End: 2023-09-21
Payer: COMMERCIAL

## 2023-09-29 ENCOUNTER — ANCILLARY ORDERS (OUTPATIENT)
Dept: ULTRASOUND IMAGING | Facility: CLINIC | Age: 43
End: 2023-09-29

## 2023-09-29 ENCOUNTER — HOSPITAL ENCOUNTER (OUTPATIENT)
Dept: ULTRASOUND IMAGING | Facility: CLINIC | Age: 43
Discharge: HOME | End: 2023-09-29
Attending: NURSE PRACTITIONER | Admitting: NURSE PRACTITIONER
Payer: COMMERCIAL

## 2023-09-29 DIAGNOSIS — C50.811 MALIGNANT NEOPLASM OF OVERLAPPING SITES OF RIGHT FEMALE BREAST (H): ICD-10-CM

## 2023-09-29 DIAGNOSIS — C50.811 MALIGNANT NEOPLASM OF OVERLAPPING SITES OF RIGHT FEMALE BREAST (H): Primary | ICD-10-CM

## 2023-09-29 PROCEDURE — 93971 EXTREMITY STUDY: CPT | Mod: RT

## 2023-10-12 ENCOUNTER — LAB REQUISITION (OUTPATIENT)
Dept: LAB | Facility: CLINIC | Age: 43
End: 2023-10-12

## 2023-10-12 DIAGNOSIS — Z01.419 ENCOUNTER FOR GYNECOLOGICAL EXAMINATION (GENERAL) (ROUTINE) WITHOUT ABNORMAL FINDINGS: ICD-10-CM

## 2023-10-12 PROCEDURE — G0145 SCR C/V CYTO,THINLAYER,RESCR: HCPCS | Performed by: OBSTETRICS & GYNECOLOGY

## 2023-10-12 PROCEDURE — 87624 HPV HI-RISK TYP POOLED RSLT: CPT | Performed by: OBSTETRICS & GYNECOLOGY

## 2023-10-31 ENCOUNTER — OFFICE VISIT (OUTPATIENT)
Dept: FAMILY MEDICINE | Facility: CLINIC | Age: 43
End: 2023-10-31
Payer: COMMERCIAL

## 2023-10-31 VITALS
RESPIRATION RATE: 18 BRPM | HEIGHT: 65 IN | BODY MASS INDEX: 28.41 KG/M2 | WEIGHT: 170.5 LBS | HEART RATE: 70 BPM | SYSTOLIC BLOOD PRESSURE: 122 MMHG | TEMPERATURE: 98.6 F | DIASTOLIC BLOOD PRESSURE: 72 MMHG | OXYGEN SATURATION: 99 %

## 2023-10-31 DIAGNOSIS — Z85.3 HX: BREAST CANCER: ICD-10-CM

## 2023-10-31 DIAGNOSIS — Z01.818 PRE-OP EXAM: Primary | ICD-10-CM

## 2023-10-31 DIAGNOSIS — Z84.89: ICD-10-CM

## 2023-10-31 LAB
ALBUMIN SERPL BCG-MCNC: 4.4 G/DL (ref 3.5–5.2)
ALP SERPL-CCNC: 58 U/L (ref 35–104)
ALT SERPL W P-5'-P-CCNC: 12 U/L (ref 0–50)
ANION GAP SERPL CALCULATED.3IONS-SCNC: 10 MMOL/L (ref 7–15)
AST SERPL W P-5'-P-CCNC: 18 U/L (ref 0–45)
BILIRUB SERPL-MCNC: 0.2 MG/DL
BUN SERPL-MCNC: 13.8 MG/DL (ref 6–20)
CALCIUM SERPL-MCNC: 9.3 MG/DL (ref 8.6–10)
CHLORIDE SERPL-SCNC: 105 MMOL/L (ref 98–107)
CREAT SERPL-MCNC: 0.95 MG/DL (ref 0.51–0.95)
DEPRECATED HCO3 PLAS-SCNC: 25 MMOL/L (ref 22–29)
EGFRCR SERPLBLD CKD-EPI 2021: 77 ML/MIN/1.73M2
ERYTHROCYTE [DISTWIDTH] IN BLOOD BY AUTOMATED COUNT: 11.8 % (ref 10–15)
GLUCOSE SERPL-MCNC: 96 MG/DL (ref 70–99)
HCT VFR BLD AUTO: 42.6 % (ref 35–47)
HGB BLD-MCNC: 14.1 G/DL (ref 11.7–15.7)
MCH RBC QN AUTO: 29 PG (ref 26.5–33)
MCHC RBC AUTO-ENTMCNC: 33.1 G/DL (ref 31.5–36.5)
MCV RBC AUTO: 88 FL (ref 78–100)
PLATELET # BLD AUTO: 249 10E3/UL (ref 150–450)
POTASSIUM SERPL-SCNC: 4.7 MMOL/L (ref 3.4–5.3)
PROT SERPL-MCNC: 7.2 G/DL (ref 6.4–8.3)
RBC # BLD AUTO: 4.87 10E6/UL (ref 3.8–5.2)
SODIUM SERPL-SCNC: 140 MMOL/L (ref 135–145)
WBC # BLD AUTO: 5.2 10E3/UL (ref 4–11)

## 2023-10-31 PROCEDURE — 85027 COMPLETE CBC AUTOMATED: CPT | Performed by: PHYSICIAN ASSISTANT

## 2023-10-31 PROCEDURE — 80053 COMPREHEN METABOLIC PANEL: CPT | Performed by: PHYSICIAN ASSISTANT

## 2023-10-31 PROCEDURE — 36415 COLL VENOUS BLD VENIPUNCTURE: CPT | Performed by: PHYSICIAN ASSISTANT

## 2023-10-31 PROCEDURE — 99214 OFFICE O/P EST MOD 30 MIN: CPT | Performed by: PHYSICIAN ASSISTANT

## 2023-10-31 SDOH — HEALTH STABILITY: PHYSICAL HEALTH: ON AVERAGE, HOW MANY DAYS PER WEEK DO YOU ENGAGE IN MODERATE TO STRENUOUS EXERCISE (LIKE A BRISK WALK)?: 2 DAYS

## 2023-10-31 ASSESSMENT — LIFESTYLE VARIABLES
HOW OFTEN DO YOU HAVE A DRINK CONTAINING ALCOHOL: MONTHLY OR LESS
HOW MANY STANDARD DRINKS CONTAINING ALCOHOL DO YOU HAVE ON A TYPICAL DAY: 1 OR 2

## 2023-10-31 ASSESSMENT — ANXIETY QUESTIONNAIRES
2. NOT BEING ABLE TO STOP OR CONTROL WORRYING: NOT AT ALL
6. BECOMING EASILY ANNOYED OR IRRITABLE: NOT AT ALL
1. FEELING NERVOUS, ANXIOUS, OR ON EDGE: NOT AT ALL
5. BEING SO RESTLESS THAT IT IS HARD TO SIT STILL: NOT AT ALL
7. FEELING AFRAID AS IF SOMETHING AWFUL MIGHT HAPPEN: NOT AT ALL
4. TROUBLE RELAXING: NOT AT ALL
GAD7 TOTAL SCORE: 0
IF YOU CHECKED OFF ANY PROBLEMS ON THIS QUESTIONNAIRE, HOW DIFFICULT HAVE THESE PROBLEMS MADE IT FOR YOU TO DO YOUR WORK, TAKE CARE OF THINGS AT HOME, OR GET ALONG WITH OTHER PEOPLE: NOT DIFFICULT AT ALL
3. WORRYING TOO MUCH ABOUT DIFFERENT THINGS: NOT AT ALL
GAD7 TOTAL SCORE: 0

## 2023-10-31 ASSESSMENT — PAIN SCALES - GENERAL: PAINLEVEL: NO PAIN (0)

## 2023-10-31 ASSESSMENT — SOCIAL DETERMINANTS OF HEALTH (SDOH)
HOW OFTEN DO YOU GET TOGETHER WITH FRIENDS OR RELATIVES?: ONCE A WEEK
IN A TYPICAL WEEK, HOW MANY TIMES DO YOU TALK ON THE PHONE WITH FAMILY, FRIENDS, OR NEIGHBORS?: TWICE A WEEK
HOW OFTEN DO YOU ATTENT MEETINGS OF THE CLUB OR ORGANIZATION YOU BELONG TO?: MORE THAN 4 TIMES PER YEAR
DO YOU BELONG TO ANY CLUBS OR ORGANIZATIONS SUCH AS CHURCH GROUPS UNIONS, FRATERNAL OR ATHLETIC GROUPS, OR SCHOOL GROUPS?: YES
HOW OFTEN DO YOU ATTEND CHURCH OR RELIGIOUS SERVICES?: MORE THAN 4 TIMES PER YEAR

## 2023-10-31 ASSESSMENT — PATIENT HEALTH QUESTIONNAIRE - PHQ9
SUM OF ALL RESPONSES TO PHQ QUESTIONS 1-9: 0
SUM OF ALL RESPONSES TO PHQ QUESTIONS 1-9: 0

## 2023-10-31 NOTE — PROGRESS NOTES
[facility]  [address] [facility] [address]  Phone: [phone]  Primary Provider: Aaseby-Aguilera, Ramona Ann  Pre-op Performing Provider: AASEBY-AGUILERA, RAMONA ANN      PREOPERATIVE EVALUATION:  Today's date: 10/31/2023    Cely is a 43 year old female who presents for a preoperative evaluation.      10/31/2023     1:20 PM   Additional Questions   Roomed by Gail PULLIAM       Surgical Information:  Surgery/Procedure Bilateral beast implants    Surgery Location: [facility]   Surgeon: Dr. Taveras  Surgery Date: 11/10/2023  Time of Surgery: 6:00am  Where patient plans to recover: At home with family  Fax number for surgical facility: [phone]    Assessment & Plan     The proposed surgical procedure is considered INTERMEDIATE risk.    Pre-op exam    - CBC with platelets  - Comprehensive metabolic panel    HX: breast cancer      FH: mastectomy              - No identified additional risk factors other than previously addressed    Antiplatelet or Anticoagulation Medication Instructions:   - Patient is on no antiplatelet or anticoagulation medications.    Additional Medication Instructions:  Patient is on no additional chronic medications    RECOMMENDATION:  APPROVAL GIVEN to proceed with proposed procedure, without further diagnostic evaluation.            Subjective       HPI related to upcoming procedure: breast implants for reconstruction        10/31/2023     1:09 PM   Preop Questions   1. Have you ever had a heart attack or stroke? No   2. Have you ever had surgery on your heart or blood vessels, such as a stent placement, a coronary artery bypass, or surgery on an artery in your head, neck, heart, or legs? No   3. Do you have chest pain with activity? No   4. Do you have a history of  heart failure? No   5. Do you currently have a cold, bronchitis or symptoms of other infection? No   6. Do you have a cough, shortness of breath, or wheezing? No   7. Do  you or anyone in your family have previous history of blood clots? No   8. Do you or does anyone in your family have a serious bleeding problem such as prolonged bleeding following surgeries or cuts? No   9. Have you ever had problems with anemia or been told to take iron pills? No   10. Have you had any abnormal blood loss such as black, tarry or bloody stools, or abnormal vaginal bleeding? No   11. Have you ever had a blood transfusion? No   12. Are you willing to have a blood transfusion if it is medically needed before, during, or after your surgery? Yes   13. Have you or any of your relatives ever had problems with anesthesia? No   14. Do you have sleep apnea, excessive snoring or daytime drowsiness? No   15. Do you have any artifical heart valves or other implanted medical devices like a pacemaker, defibrillator, or continuous glucose monitor? No   16. Do you have artificial joints? No   17. Are you allergic to latex? No   18. Is there any chance that you may be pregnant? No       Health Care Directive:  Patient does not have a Health Care Directive or Living Will: Discussed advance care planning with patient; however, patient declined at this time.    Preoperative Review of :   reviewed - no record of controlled substances prescribed.      Status of Chronic Conditions:  See problem list for active medical problems.  Problems all longstanding and stable, except as noted/documented.  See ROS for pertinent symptoms related to these conditions.    Review of Systems  CONSTITUTIONAL: NEGATIVE for fever, chills, change in weight  INTEGUMENTARY/SKIN: NEGATIVE for worrisome rashes, moles or lesions  EYES: NEGATIVE for vision changes or irritation  ENT/MOUTH: NEGATIVE for ear, mouth and throat problems  RESP: NEGATIVE for significant cough or SOB  CV: NEGATIVE for chest pain, palpitations or peripheral edema  GI: NEGATIVE for nausea, abdominal pain, heartburn, or change in bowel habits  : NEGATIVE for  frequency, dysuria, or hematuria  MUSCULOSKELETAL: NEGATIVE for significant arthralgias or myalgia  NEURO: NEGATIVE for weakness, dizziness or paresthesias  ENDOCRINE: NEGATIVE for temperature intolerance, skin/hair changes  HEME: NEGATIVE for bleeding problems  PSYCHIATRIC: NEGATIVE for changes in mood or affect    Patient Active Problem List    Diagnosis Date Noted     (normal spontaneous vaginal delivery) 10/18/2014     Priority: Medium    Active labor 10/17/2014     Priority: Medium    CARDIOVASCULAR SCREENING; LDL GOAL LESS THAN 160 10/30/2012     Priority: Medium      Past Medical History:   Diagnosis Date    Abnormal Pap smear     been awhile, fine since    No significant medical problems      Past Surgical History:   Procedure Laterality Date    CYSTECTOMY OVARIAN BENIGN      DILATION AND CURETTAGE SUCTION  2013    Procedure: DILATION AND CURETTAGE SUCTION;  DILATION AND CURETTAGE SUCTION ;  Surgeon: Shiloh Jasmine MD;  Location: [facility]    DILATION AND CURETTAGE SUCTION WITH ULTRASOUND GUIDANCE  2013    Procedure: DILATION AND CURETTAGE SUCTION WITH ULTRASOUND GUIDANCE;  DILATION AND CURETTAGE SUCTION WITH ULTRASOUND GUIDANCE;  Surgeon: Isaiah Pinedo MD;  Location: [facility] OR    ENT SURGERY      Thyroglossal duct cyst    wisdom teeth extraction[       Current Outpatient Medications   Medication Sig Dispense Refill    Ascorbic Acid (VITAMIN C ER PO) Take  by mouth.      dapsone 5 % topical gel APPLY TO THE ENTIRE FACE TWICE DAILY.      PRENATAL VITAMINS PO  (Patient not taking: Reported on 10/31/2023)         No Known Allergies     Social History     Tobacco Use    Smoking status: Never    Smokeless tobacco: Never   Substance Use Topics    Alcohol use: No     Comment: not w/pregnancy     Family History   Problem Relation Age of Onset    Hypertension Mother     Lipids Mother     Coronary Artery Disease Father     Heart Disease Father     Diabetes Maternal Grandmother      History   Drug Use  "No         Objective     /72 (BP Location: Right arm, Patient Position: Sitting, Cuff Size: Adult Regular)   Pulse 70   Temp 98.6  F (37  C) (Oral)   Resp 18   Ht 1.651 m (5' 5\")   Wt 77.3 kg (170 lb 8 oz)   LMP 10/20/2023 (Exact Date)   SpO2 99%   BMI 28.37 kg/m      Physical Exam    GENERAL APPEARANCE: healthy, alert and no distress     EYES: EOMI, PERRL     HENT: ear canals and TM's normal and nose and mouth without ulcers or lesions     NECK: no adenopathy, no asymmetry, masses, or scars and thyroid normal to palpation     RESP: lungs clear to auscultation - no rales, rhonchi or wheezes     CV: regular rates and rhythm, normal S1 S2, no S3 or S4 and no murmur, click or rub     ABDOMEN:  soft, nontender, no HSM or masses and bowel sounds normal     MS: extremities normal- no gross deformities noted, no evidence of inflammation in joints, FROM in all extremities.     SKIN: no suspicious lesions or rashes     NEURO: Normal strength and tone, sensory exam grossly normal, mentation intact and speech normal     PSYCH: mentation appears normal. and affect normal/bright     LYMPHATICS: No cervical adenopathy    No results for input(s): \"HGB\", \"PLT\", \"INR\", \"NA\", \"POTASSIUM\", \"CR\", \"A1C\" in the last 56830 hours.     Diagnostics:  Recent Results (from the past 168 hour(s))   CBC with platelets    Collection Time: 10/31/23  1:47 PM   Result Value Ref Range    WBC Count 5.2 4.0 - 11.0 10e3/uL    RBC Count 4.87 3.80 - 5.20 10e6/uL    Hemoglobin 14.1 11.7 - 15.7 g/dL    Hematocrit 42.6 35.0 - 47.0 %    MCV 88 78 - 100 fL    MCH 29.0 26.5 - 33.0 pg    MCHC 33.1 31.5 - 36.5 g/dL    RDW 11.8 10.0 - 15.0 %    Platelet Count 249 150 - 450 10e3/uL   Comprehensive metabolic panel    Collection Time: 10/31/23  1:47 PM   Result Value Ref Range    Sodium 140 135 - 145 mmol/L    Potassium 4.7 3.4 - 5.3 mmol/L    Carbon Dioxide (CO2) 25 22 - 29 mmol/L    Anion Gap 10 7 - 15 mmol/L    Urea Nitrogen 13.8 6.0 - 20.0 mg/dL    " Creatinine 0.95 0.51 - 0.95 mg/dL    GFR Estimate 77 >60 mL/min/1.73m2    Calcium 9.3 8.6 - 10.0 mg/dL    Chloride 105 98 - 107 mmol/L    Glucose 96 70 - 99 mg/dL    Alkaline Phosphatase 58 35 - 104 U/L    AST 18 0 - 45 U/L    ALT 12 0 - 50 U/L    Protein Total 7.2 6.4 - 8.3 g/dL    Albumin 4.4 3.5 - 5.2 g/dL    Bilirubin Total 0.2 <=1.2 mg/dL      No EKG required, no history of coronary heart disease, significant arrhythmia, peripheral arterial disease or other structural heart disease.  Had an echo 8/7/23 that was within normal limits      Revised Cardiac Risk Index (RCRI):  The patient has the following serious cardiovascular risks for perioperative complications:   - No serious cardiac risks = 0 points     RCRI Interpretation: 1 point: Class II (low risk - 0.9% complication rate)         Signed Electronically by: Ramona Ann Aaseby-Aguilera, PA-C  Copy of this evaluation report is provided to requesting physician.      Answers submitted by the patient for this visit:  Patient Health Questionnaire (Submitted on 10/31/2023)  PHQ9 TOTAL SCORE: 0  ALBIN-7 (Submitted on 10/31/2023)  ALBIN 7 TOTAL SCORE: 0

## 2023-12-07 ENCOUNTER — LAB REQUISITION (OUTPATIENT)
Dept: LAB | Facility: CLINIC | Age: 43
End: 2023-12-07

## 2023-12-07 DIAGNOSIS — R87.810 CERVICAL HIGH RISK HUMAN PAPILLOMAVIRUS (HPV) DNA TEST POSITIVE: ICD-10-CM

## 2023-12-07 PROCEDURE — 88305 TISSUE EXAM BY PATHOLOGIST: CPT | Performed by: PATHOLOGY

## 2023-12-11 ENCOUNTER — ANCILLARY ORDERS (OUTPATIENT)
Dept: CARDIOLOGY | Facility: CLINIC | Age: 43
End: 2023-12-11

## 2023-12-11 DIAGNOSIS — Z51.81 ENCOUNTER FOR THERAPEUTIC DRUG MONITORING: Primary | ICD-10-CM

## 2023-12-14 ENCOUNTER — HOSPITAL ENCOUNTER (OUTPATIENT)
Dept: CARDIOLOGY | Facility: CLINIC | Age: 43
Discharge: HOME | End: 2023-12-14
Attending: INTERNAL MEDICINE | Admitting: INTERNAL MEDICINE
Payer: COMMERCIAL

## 2023-12-14 DIAGNOSIS — Z51.81 ENCOUNTER FOR THERAPEUTIC DRUG MONITORING: ICD-10-CM

## 2023-12-14 PROCEDURE — 93356 MYOCRD STRAIN IMG SPCKL TRCK: CPT | Performed by: INTERNAL MEDICINE

## 2023-12-14 PROCEDURE — 93306 TTE W/DOPPLER COMPLETE: CPT | Mod: 26 | Performed by: INTERNAL MEDICINE

## 2023-12-14 PROCEDURE — 93356 MYOCRD STRAIN IMG SPCKL TRCK: CPT

## 2023-12-20 ENCOUNTER — OFFICE VISIT (OUTPATIENT)
Dept: FAMILY MEDICINE | Facility: CLINIC | Age: 43
End: 2023-12-20
Payer: COMMERCIAL

## 2023-12-20 VITALS
HEIGHT: 65 IN | WEIGHT: 168.2 LBS | HEART RATE: 83 BPM | OXYGEN SATURATION: 97 % | TEMPERATURE: 98.4 F | RESPIRATION RATE: 16 BRPM | SYSTOLIC BLOOD PRESSURE: 114 MMHG | DIASTOLIC BLOOD PRESSURE: 68 MMHG | BODY MASS INDEX: 28.02 KG/M2

## 2023-12-20 DIAGNOSIS — Z90.13 HX OF BILATERAL MASTECTOMY: ICD-10-CM

## 2023-12-20 DIAGNOSIS — Z01.818 PREOP GENERAL PHYSICAL EXAM: Primary | ICD-10-CM

## 2023-12-20 DIAGNOSIS — Z85.3 HX: BREAST CANCER: ICD-10-CM

## 2023-12-20 PROCEDURE — 99214 OFFICE O/P EST MOD 30 MIN: CPT | Performed by: PHYSICIAN ASSISTANT

## 2023-12-20 ASSESSMENT — PAIN SCALES - GENERAL: PAINLEVEL: NO PAIN (0)

## 2023-12-20 NOTE — PROGRESS NOTES
[facility]  [id number] [place] [address]  Phone: [phone]  Primary Provider: Aaseby-Aguilera, Ramona Ann  Pre-op Performing Provider: AASEBY-AGUILERA, RAMONA ANN      PREOPERATIVE EVALUATION:  Today's date: 12/20/2023    Cely is a 43 year old, presenting for the following:  Pre-Op Exam        12/20/2023     2:44 PM   Additional Questions   Roomed by EDWIN Xiong   Accompanied by Self       Surgical Information:  Surgery/Procedure: Bilateral Breast Implants  Surgery Location: [facility]  Surgeon: Dr. Taveras  Surgery Date: 12/29/2023  Time of Surgery: 11:00  Where patient plans to recover: At home with family  Fax number for surgical facility: [phone]    Assessment & Plan     The proposed surgical procedure is considered INTERMEDIATE risk.    Preop general physical exam      HX: breast cancer      Hx of bilateral mastectomy              - No identified additional risk factors other than previously addressed    Antiplatelet or Anticoagulation Medication Instructions:   - Patient is on no antiplatelet or anticoagulation medications.    Additional Medication Instructions:  Patient is on no additional chronic medications    RECOMMENDATION:  APPROVAL GIVEN to proceed with proposed procedure, without further diagnostic evaluation.            Subjective       HPI related to upcoming procedure: breast implants for reconstruction         12/20/2023     2:41 PM   Preop Questions   1. Have you ever had a heart attack or stroke? No   2. Have you ever had surgery on your heart or blood vessels, such as a stent placement, a coronary artery bypass, or surgery on an artery in your head, neck, heart, or legs? No   3. Do you have chest pain with activity? No   4. Do you have a history of  heart failure? No   5. Do you currently have a cold, bronchitis or symptoms of other infection? No   6. Do you have a cough, shortness of breath, or wheezing? No   7. Do  you or anyone in your family have previous history of blood clots? No   8. Do you or does anyone in your family have a serious bleeding problem such as prolonged bleeding following surgeries or cuts? No   9. Have you ever had problems with anemia or been told to take iron pills? No   10. Have you had any abnormal blood loss such as black, tarry or bloody stools, or abnormal vaginal bleeding? No   11. Have you ever had a blood transfusion? No   12. Are you willing to have a blood transfusion if it is medically needed before, during, or after your surgery? Yes   13. Have you or any of your relatives ever had problems with anesthesia? No   14. Do you have sleep apnea, excessive snoring or daytime drowsiness? No   15. Do you have any artifical heart valves or other implanted medical devices like a pacemaker, defibrillator, or continuous glucose monitor? No   16. Do you have artificial joints? No   17. Are you allergic to latex? No   18. Is there any chance that you may be pregnant? No       Health Care Directive:  Patient does not have a Health Care Directive or Living Will: Discussed advance care planning with patient; however, patient declined at this time.    Preoperative Review of :   reviewed - no record of controlled substances prescribed.      Status of Chronic Conditions:  See problem list for active medical problems.  Problems all longstanding and stable, except as noted/documented.  See ROS for pertinent symptoms related to these conditions.    Review of Systems  CONSTITUTIONAL: NEGATIVE for fever, chills, change in weight  INTEGUMENTARY/SKIN: NEGATIVE for worrisome rashes, moles or lesions  EYES: NEGATIVE for vision changes or irritation  ENT/MOUTH: NEGATIVE for ear, mouth and throat problems  RESP: NEGATIVE for significant cough or SOB  CV: NEGATIVE for chest pain, palpitations or peripheral edema  GI: NEGATIVE for nausea, abdominal pain, heartburn, or change in bowel habits  : NEGATIVE for  frequency, dysuria, or hematuria  MUSCULOSKELETAL: NEGATIVE for significant arthralgias or myalgia  NEURO: NEGATIVE for weakness, dizziness or paresthesias  ENDOCRINE: NEGATIVE for temperature intolerance, skin/hair changes  HEME: NEGATIVE for bleeding problems  PSYCHIATRIC: NEGATIVE for changes in mood or affect    Patient Active Problem List    Diagnosis Date Noted    HX: breast cancer 10/31/2023     Priority: Medium     (normal spontaneous vaginal delivery) 10/18/2014     Priority: Medium    Active labor 10/17/2014     Priority: Medium    CARDIOVASCULAR SCREENING; LDL GOAL LESS THAN 160 10/30/2012     Priority: Medium      Past Medical History:   Diagnosis Date    Abnormal Pap smear     been awhile, fine since    No significant medical problems      Past Surgical History:   Procedure Laterality Date    CYSTECTOMY OVARIAN BENIGN      DILATION AND CURETTAGE SUCTION  2013    Procedure: DILATION AND CURETTAGE SUCTION;  DILATION AND CURETTAGE SUCTION ;  Surgeon: Shiloh Jasmine MD;  Location: [facility]    DILATION AND CURETTAGE SUCTION WITH ULTRASOUND GUIDANCE  2013    Procedure: DILATION AND CURETTAGE SUCTION WITH ULTRASOUND GUIDANCE;  DILATION AND CURETTAGE SUCTION WITH ULTRASOUND GUIDANCE;  Surgeon: Isaiah Pinedo MD;  Location: [facility] OR    ENT SURGERY      Thyroglossal duct cyst    wisdom teeth extraction[       Current Outpatient Medications   Medication Sig Dispense Refill    Ascorbic Acid (VITAMIN C ER PO) Take  by mouth.      dapsone 5 % topical gel APPLY TO THE ENTIRE FACE TWICE DAILY.         No Known Allergies     Social History     Tobacco Use    Smoking status: Never    Smokeless tobacco: Never   Substance Use Topics    Alcohol use: No     Comment: not w/pregnancy     Family History   Problem Relation Age of Onset    Hypertension Mother     Lipids Mother     Coronary Artery Disease Father     Heart Disease Father     Diabetes Maternal Grandmother      History   Drug Use No        "  Objective     /68 (BP Location: Right arm, Patient Position: Sitting, Cuff Size: Adult Regular)   Pulse 83   Temp 98.4  F (36.9  C) (Oral)   Resp 16   Ht 1.651 m (5' 5\")   Wt 76.3 kg (168 lb 3.2 oz)   LMP 12/05/2023 (Approximate)   SpO2 97%   Breastfeeding No   BMI 27.99 kg/m      Physical Exam    GENERAL APPEARANCE: healthy, alert and no distress     EYES: EOMI, PERRL     HENT: ear canals and TM's normal and nose and mouth without ulcers or lesions     NECK: no adenopathy, no asymmetry, masses, or scars and thyroid normal to palpation     RESP: lungs clear to auscultation - no rales, rhonchi or wheezes     CV: regular rates and rhythm, normal S1 S2, no S3 or S4 and no murmur, click or rub     ABDOMEN:  soft, nontender, no HSM or masses and bowel sounds normal     MS: extremities normal- no gross deformities noted, no evidence of inflammation in joints, FROM in all extremities.     SKIN: no suspicious lesions or rashes     NEURO: Normal strength and tone, sensory exam grossly normal, mentation intact and speech normal     PSYCH: mentation appears normal. and affect normal/bright     LYMPHATICS: No cervical adenopathy    Recent Labs   Lab Test 10/31/23  1347   HGB 14.1         POTASSIUM 4.7   CR 0.95        Diagnostics:  Component      Latest Ref Rng 10/31/2023  1:47 PM   Sodium      135 - 145 mmol/L 140    Potassium      3.4 - 5.3 mmol/L 4.7    Carbon Dioxide (CO2)      22 - 29 mmol/L 25    Anion Gap      7 - 15 mmol/L 10    Urea Nitrogen      6.0 - 20.0 mg/dL 13.8    Creatinine      0.51 - 0.95 mg/dL 0.95    GFR Estimate      >60 mL/min/1.73m2 77    Calcium      8.6 - 10.0 mg/dL 9.3    Chloride      98 - 107 mmol/L 105    Glucose      70 - 99 mg/dL 96    Alkaline Phosphatase      35 - 104 U/L 58    AST      0 - 45 U/L 18    ALT      0 - 50 U/L 12    Protein Total      6.4 - 8.3 g/dL 7.2    Albumin      3.5 - 5.2 g/dL 4.4    Bilirubin Total      <=1.2 mg/dL 0.2    WBC      4.0 - 11.0 " 10e3/uL 5.2    RBC Count      3.80 - 5.20 10e6/uL 4.87    Hemoglobin      11.7 - 15.7 g/dL 14.1    Hematocrit      35.0 - 47.0 % 42.6    MCV      78 - 100 fL 88    MCH      26.5 - 33.0 pg 29.0    MCHC      31.5 - 36.5 g/dL 33.1    RDW      10.0 - 15.0 % 11.8    Platelet Count      150 - 450 10e3/uL 249        No EKG required for low risk surgery (cataract, skin procedure, breast biopsy, etc).    Had Echo on 12/14/23.  EF is 56 %    Revised Cardiac Risk Index (RCRI):  The patient has the following serious cardiovascular risks for perioperative complications:   - No serious cardiac risks = 0 points     RCRI Interpretation: 1 point: Class II (low risk - 0.9% complication rate)         Signed Electronically by: Ramona Ann Aaseby-Aguilera, PA-C  Copy of this evaluation report is provided to requesting physician.

## 2023-12-20 NOTE — PATIENT INSTRUCTIONS
Preparing for Your Surgery  Getting started  A nurse will call you to review your health history and instructions. They will give you an arrival time based on your scheduled surgery time. Please be ready to share:  Your doctor's clinic name and phone number  Your medical, surgical, and anesthesia history  A list of allergies and sensitivities  A list of medicines, including herbal treatments and over-the-counter drugs  Whether the patient has a legal guardian (ask how to send us the papers in advance)  Please tell us if you're pregnant--or if there's any chance you might be pregnant. Some surgeries may injure a fetus (unborn baby), so they require a pregnancy test. Surgeries that are safe for a fetus don't always need a test, and you can choose whether to have one.   If you have a child who's having surgery, please ask for a copy of Preparing for Your Child's Surgery.    Preparing for surgery  Within 10 to 30 days of surgery: Have a pre-op exam (sometimes called an H&P, or History and Physical). This can be done at a clinic or pre-operative center.  If you're having a , you may not need this exam. Talk to your care team.  At your pre-op exam, talk to your care team about all medicines you take. If you need to stop any medicines before surgery, ask when to start taking them again.  We do this for your safety. Many medicines can make you bleed too much during surgery. Some change how well surgery (anesthesia) drugs work.  Call your insurance company to let them know you're having surgery. (If you don't have insurance, call [phone].)  Call your clinic if there's any change in your health. This includes signs of a cold or flu (sore throat, runny nose, cough, rash, fever). It also includes a scrape or scratch near the surgery site.  If you have questions on the day of surgery, call your hospital or surgery center.  Eating and drinking guidelines  For your safety: Unless your surgeon tells you otherwise,  follow the guidelines below.  Eat and drink as usual until 8 hours before you arrive for surgery. After that, no food or milk.  Drink clear liquids until 2 hours before you arrive. These are liquids you can see through, like water, Gatorade, and Propel Water. They also include plain black coffee and tea (no cream or milk), candy, and breath mints. You can spit out gum when you arrive.  If you drink alcohol: Stop drinking it the night before surgery.  If your care team tells you to take medicine on the morning of surgery, it's okay to take it with a sip of water.  Preventing infection  Shower or bathe the night before and morning of your surgery. Follow the instructions your clinic gave you. (If no instructions, use regular soap.)  Don't shave or clip hair near your surgery site. We'll remove the hair if needed.  Don't smoke or vape the morning of surgery. You may chew nicotine gum up to 2 hours before surgery. A nicotine patch is okay.  Note: Some surgeries require you to completely quit smoking and nicotine. Check with your surgeon.  Your care team will make every effort to keep you safe from infection. We will:  Clean our hands often with soap and water (or an alcohol-based hand rub).  Clean the skin at your surgery site with a special soap that kills germs.  Give you a special gown to keep you warm. (Cold raises the risk of infection.)  Wear special hair covers, masks, gowns and gloves during surgery.  Give antibiotic medicine, if prescribed. Not all surgeries need antibiotics.  What to bring on the day of surgery  Photo ID and insurance card  Copy of your health care directive, if you have one  Glasses and hearing aids (bring cases)  You can't wear contacts during surgery  Inhaler and eye drops, if you use them (tell us about these when you arrive)  CPAP machine or breathing device, if you use them  A few personal items, if spending the night  If you have . . .  A pacemaker, ICD (cardiac defibrillator) or other  implant: Bring the ID card.  An implanted stimulator: Bring the remote control.  A legal guardian: Bring a copy of the certified (court-stamped) guardianship papers.  Please remove any jewelry, including body piercings. Leave jewelry and other valuables at home.  If you're going home the day of surgery  You must have a responsible adult drive you home. They should stay with you overnight as well.  If you don't have someone to stay with you, and you aren't safe to go home alone, we may keep you overnight. Insurance often won't pay for this.  After surgery  If it's hard to control your pain or you need more pain medicine, please call your surgeon's office.  Questions?   If you have any questions for your care team, list them here: _________________________________________________________________________________________________________________________________________________________________________ ____________________________________ ____________________________________ ____________________________________  For informational purposes only. Not to replace the advice of your health care provider. Copyright   2003, 2019 [facility]. All rights reserved. Clinically reviewed by Viktoria Gallego MD. [employer] [id number] - REV 12/22.

## 2024-03-04 ENCOUNTER — ANCILLARY ORDERS (OUTPATIENT)
Dept: CARDIOLOGY | Facility: CLINIC | Age: 44
End: 2024-03-04

## 2024-03-04 DIAGNOSIS — Z51.81 ENCOUNTER FOR THERAPEUTIC DRUG MONITORING: Primary | ICD-10-CM

## 2024-03-13 ENCOUNTER — HOSPITAL ENCOUNTER (OUTPATIENT)
Dept: CARDIOLOGY | Facility: CLINIC | Age: 44
Discharge: HOME | End: 2024-03-13
Attending: NURSE PRACTITIONER | Admitting: NURSE PRACTITIONER
Payer: COMMERCIAL

## 2024-03-13 DIAGNOSIS — Z51.81 ENCOUNTER FOR THERAPEUTIC DRUG MONITORING: ICD-10-CM

## 2024-03-13 LAB — LVEF ECHO: NORMAL

## 2024-03-13 PROCEDURE — 93306 TTE W/DOPPLER COMPLETE: CPT | Mod: 26 | Performed by: INTERNAL MEDICINE

## 2024-03-13 PROCEDURE — 93306 TTE W/DOPPLER COMPLETE: CPT

## 2024-03-13 PROCEDURE — 93356 MYOCRD STRAIN IMG SPCKL TRCK: CPT | Performed by: INTERNAL MEDICINE

## 2024-05-01 ENCOUNTER — OFFICE VISIT (OUTPATIENT)
Dept: FAMILY MEDICINE | Facility: CLINIC | Age: 44
End: 2024-05-01
Payer: COMMERCIAL

## 2024-05-01 VITALS
WEIGHT: 171 LBS | TEMPERATURE: 98 F | HEART RATE: 73 BPM | RESPIRATION RATE: 14 BRPM | DIASTOLIC BLOOD PRESSURE: 83 MMHG | BODY MASS INDEX: 28.49 KG/M2 | SYSTOLIC BLOOD PRESSURE: 128 MMHG | OXYGEN SATURATION: 98 % | HEIGHT: 65 IN

## 2024-05-01 DIAGNOSIS — Z90.13 HX OF BILATERAL MASTECTOMY: ICD-10-CM

## 2024-05-01 DIAGNOSIS — Z01.818 PREOP GENERAL PHYSICAL EXAM: Primary | ICD-10-CM

## 2024-05-01 LAB
ERYTHROCYTE [DISTWIDTH] IN BLOOD BY AUTOMATED COUNT: 11.9 % (ref 10–15)
HCT VFR BLD AUTO: 42.9 % (ref 35–47)
HGB BLD-MCNC: 14.2 G/DL (ref 11.7–15.7)
MCH RBC QN AUTO: 29 PG (ref 26.5–33)
MCHC RBC AUTO-ENTMCNC: 33.1 G/DL (ref 31.5–36.5)
MCV RBC AUTO: 88 FL (ref 78–100)
PLATELET # BLD AUTO: 265 10E3/UL (ref 150–450)
RBC # BLD AUTO: 4.89 10E6/UL (ref 3.8–5.2)
WBC # BLD AUTO: 5 10E3/UL (ref 4–11)

## 2024-05-01 PROCEDURE — 85027 COMPLETE CBC AUTOMATED: CPT | Performed by: PHYSICIAN ASSISTANT

## 2024-05-01 PROCEDURE — 80048 BASIC METABOLIC PNL TOTAL CA: CPT | Performed by: PHYSICIAN ASSISTANT

## 2024-05-01 PROCEDURE — 99214 OFFICE O/P EST MOD 30 MIN: CPT | Performed by: PHYSICIAN ASSISTANT

## 2024-05-01 PROCEDURE — 36415 COLL VENOUS BLD VENIPUNCTURE: CPT | Performed by: PHYSICIAN ASSISTANT

## 2024-05-01 NOTE — PATIENT INSTRUCTIONS
Preparing for Your Surgery  Getting started  A nurse will call you to review your health history and instructions. They will give you an arrival time based on your scheduled surgery time. Please be ready to share:  Your doctor's clinic name and phone number  Your medical, surgical, and anesthesia history  A list of allergies and sensitivities  A list of medicines, including herbal treatments and over-the-counter drugs  Whether the patient has a legal guardian (ask how to send us the papers in advance)  Please tell us if you're pregnant--or if there's any chance you might be pregnant. Some surgeries may injure a fetus (unborn baby), so they require a pregnancy test. Surgeries that are safe for a fetus don't always need a test, and you can choose whether to have one.   If you have a child who's having surgery, please ask for a copy of Preparing for Your Child's Surgery.    Preparing for surgery  Within 10 to 30 days of surgery: Have a pre-op exam (sometimes called an H&P, or History and Physical). This can be done at a clinic or pre-operative center.  If you're having a , you may not need this exam. Talk to your care team.  At your pre-op exam, talk to your care team about all medicines you take. If you need to stop any medicines before surgery, ask when to start taking them again.  We do this for your safety. Many medicines can make you bleed too much during surgery. Some change how well surgery (anesthesia) drugs work.  Call your insurance company to let them know you're having surgery. (If you don't have insurance, call [phone].)  Call your clinic if there's any change in your health. This includes signs of a cold or flu (sore throat, runny nose, cough, rash, fever). It also includes a scrape or scratch near the surgery site.  If you have questions on the day of surgery, call your hospital or surgery center.  Eating and drinking guidelines  For your safety: Unless your surgeon tells you otherwise,  follow the guidelines below.  Eat and drink as usual until 8 hours before you arrive for surgery. After that, no food or milk.  Drink clear liquids until 2 hours before you arrive. These are liquids you can see through, like water, Gatorade, and Propel Water. They also include plain black coffee and tea (no cream or milk), candy, and breath mints. You can spit out gum when you arrive.  If you drink alcohol: Stop drinking it the night before surgery.  If your care team tells you to take medicine on the morning of surgery, it's okay to take it with a sip of water.  Preventing infection  Shower or bathe the night before and morning of your surgery. Follow the instructions your clinic gave you. (If no instructions, use regular soap.)  Don't shave or clip hair near your surgery site. We'll remove the hair if needed.  Don't smoke or vape the morning of surgery. You may chew nicotine gum up to 2 hours before surgery. A nicotine patch is okay.  Note: Some surgeries require you to completely quit smoking and nicotine. Check with your surgeon.  Your care team will make every effort to keep you safe from infection. We will:  Clean our hands often with soap and water (or an alcohol-based hand rub).  Clean the skin at your surgery site with a special soap that kills germs.  Give you a special gown to keep you warm. (Cold raises the risk of infection.)  Wear special hair covers, masks, gowns and gloves during surgery.  Give antibiotic medicine, if prescribed. Not all surgeries need antibiotics.  What to bring on the day of surgery  Photo ID and insurance card  Copy of your health care directive, if you have one  Glasses and hearing aids (bring cases)  You can't wear contacts during surgery  Inhaler and eye drops, if you use them (tell us about these when you arrive)  CPAP machine or breathing device, if you use them  A few personal items, if spending the night  If you have . . .  A pacemaker, ICD (cardiac defibrillator) or other  implant: Bring the ID card.  An implanted stimulator: Bring the remote control.  A legal guardian: Bring a copy of the certified (court-stamped) guardianship papers.  Please remove any jewelry, including body piercings. Leave jewelry and other valuables at home.  If you're going home the day of surgery  You must have a responsible adult drive you home. They should stay with you overnight as well.  If you don't have someone to stay with you, and you aren't safe to go home alone, we may keep you overnight. Insurance often won't pay for this.  After surgery  If it's hard to control your pain or you need more pain medicine, please call your surgeon's office.  Questions?   If you have any questions for your care team, list them here: _________________________________________________________________________________________________________________________________________________________________________ ____________________________________ ____________________________________ ____________________________________  For informational purposes only. Not to replace the advice of your health care provider. Copyright   2003, 2019 [employer] [facility]. All rights reserved. Clinically reviewed by Viktoria Gallego MD. [employer] 845374 - REV 12/22.    How to Take Your Medication Before Surgery  - Take all of your medications before surgery as usual

## 2024-05-01 NOTE — PROGRESS NOTES
Preoperative Evaluation  [facility]  [address] [address]  Phone: [phone]  Primary Provider: Aaseby-Aguilera, Ramona Ann  Pre-op Performing Provider: AASEBY-AGUILERA, RAMONA ANN  May 1, 2024       Cely is a 43 year old, presenting for the following:  Pre-Op Exam        5/1/2024     4:36 PM   Additional Questions   Roomed by Edenilson Michael   Accompanied by self     Surgical Information  Surgery/Procedure: Fat grafting  Surgery Location: [facility]  Surgeon: DR. Taveras  Surgery Date: 5-9-24  Time of Surgery: 9:45am  Where patient plans to recover: At home with family  Fax number for surgical facility: [phone]    Assessment & Plan     The proposed surgical procedure is considered LOW risk.    Preop general physical exam    - CBC with platelets  - Basic metabolic panel    Hx of bilateral mastectomy              - No identified additional risk factors other than previously addressed    Antiplatelet or Anticoagulation Medication Instructions   - Patient is on no antiplatelet or anticoagulation medications.    Additional Medication Instructions  Patient is on no additional chronic medications    Recommendation  APPROVAL GIVEN to proceed with proposed procedure, without further diagnostic evaluation.      No LOS data to display   Time spent by me doing chart review, history and exam, documentation and further activities per the note    Subjective       HPI related to upcoming procedure: part of reconstruction after double mastectomy due to breast cancer         4/30/2024     6:00 PM   Preop Questions   1. Have you ever had a heart attack or stroke? No   2. Have you ever had surgery on your heart or blood vessels, such as a stent placement, a coronary artery bypass, or surgery on an artery in your head, neck, heart, or legs? No   3. Do you have chest pain with activity? No   4. Do you have a history of  heart failure? No   5. Do you currently have a cold,  bronchitis or symptoms of other infection? No   6. Do you have a cough, shortness of breath, or wheezing? No   7. Do you or anyone in your family have previous history of blood clots? No   8. Do you or does anyone in your family have a serious bleeding problem such as prolonged bleeding following surgeries or cuts? No   9. Have you ever had problems with anemia or been told to take iron pills? No   10. Have you had any abnormal blood loss such as black, tarry or bloody stools, or abnormal vaginal bleeding? No   11. Have you ever had a blood transfusion? No   12. Are you willing to have a blood transfusion if it is medically needed before, during, or after your surgery? Yes   13. Have you or any of your relatives ever had problems with anesthesia? No   14. Do you have sleep apnea, excessive snoring or daytime drowsiness? No   15. Do you have any artifical heart valves or other implanted medical devices like a pacemaker, defibrillator, or continuous glucose monitor? No   16. Do you have artificial joints? No   17. Are you allergic to latex? No   18. Is there any chance that you may be pregnant? No       Health Care Directive  Patient does not have a Health Care Directive or Living Will: Discussed advance care planning with patient; however, patient declined at this time.    Preoperative Review of    reviewed - no record of controlled substances prescribed.      Status of Chronic Conditions:  See problem list for active medical problems.  Problems all longstanding and stable, except as noted/documented.  See ROS for pertinent symptoms related to these conditions.    Patient Active Problem List    Diagnosis Date Noted    HX: breast cancer 10/31/2023     Priority: Medium     (normal spontaneous vaginal delivery) 10/18/2014     Priority: Medium    Active labor 10/17/2014     Priority: Medium    CARDIOVASCULAR SCREENING; LDL GOAL LESS THAN 160 10/30/2012     Priority: Medium      Past Medical History:   Diagnosis  Date    Abnormal Pap smear     been awhile, fine since    No significant medical problems      Past Surgical History:   Procedure Laterality Date    CYSTECTOMY OVARIAN BENIGN      DILATION AND CURETTAGE SUCTION  12/13/2013    Procedure: DILATION AND CURETTAGE SUCTION;  DILATION AND CURETTAGE SUCTION ;  Surgeon: Shiloh Jasmine MD;  Location: [facility]    DILATION AND CURETTAGE SUCTION WITH ULTRASOUND GUIDANCE  8/19/2013    Procedure: DILATION AND CURETTAGE SUCTION WITH ULTRASOUND GUIDANCE;  DILATION AND CURETTAGE SUCTION WITH ULTRASOUND GUIDANCE;  Surgeon: Isaiha Pinedo MD;  Location: [facility] OR    ENT SURGERY  2010    Thyroglossal duct cyst    wisdom teeth extraction[       Current Outpatient Medications   Medication Sig Dispense Refill    Ascorbic Acid (VITAMIN C ER PO) Take  by mouth.      dapsone 5 % topical gel APPLY TO THE ENTIRE FACE TWICE DAILY.         No Known Allergies     Social History     Tobacco Use    Smoking status: Never    Smokeless tobacco: Never   Substance Use Topics    Alcohol use: No     Comment: not w/pregnancy     Family History   Problem Relation Age of Onset    Hypertension Mother     Lipids Mother     Coronary Artery Disease Father     Heart Disease Father     Diabetes Maternal Grandmother      History   Drug Use No         Review of Systems    Review of Systems  CONSTITUTIONAL: NEGATIVE for fever, chills, change in weight  INTEGUMENTARY/SKIN: NEGATIVE for worrisome rashes, moles or lesions  EYES: NEGATIVE for vision changes or irritation  ENT/MOUTH: NEGATIVE for ear, mouth and throat problems  RESP: NEGATIVE for significant cough or SOB  BREAST: NEGATIVE for masses, tenderness or discharge  CV: NEGATIVE for chest pain, palpitations or peripheral edema  GI: NEGATIVE for nausea, abdominal pain, heartburn, or change in bowel habits  : NEGATIVE for frequency, dysuria, or hematuria  MUSCULOSKELETAL: NEGATIVE for significant arthralgias or myalgia  NEURO: NEGATIVE for weakness, dizziness or  "paresthesias  ENDOCRINE: NEGATIVE for temperature intolerance, skin/hair changes  HEME: NEGATIVE for bleeding problems  PSYCHIATRIC: NEGATIVE for changes in mood or affect    Objective    There were no vitals taken for this visit.   Estimated body mass index is 27.99 kg/m  as calculated from the following:    Height as of 12/20/23: 1.651 m (5' 5\").    Weight as of 12/20/23: 76.3 kg (168 lb 3.2 oz).  Physical Exam  GENERAL: alert and no distress  HENT: ear canals and TM's normal, nose and mouth without ulcers or lesions  RESP: lungs clear to auscultation - no rales, rhonchi or wheezes  CV: regular rate and rhythm, normal S1 S2, no S3 or S4, no murmur, click or rub, no peripheral edema   ABDOMEN: soft, nontender, no hepatosplenomegaly, no masses and bowel sounds normal  MS: no gross musculoskeletal defects noted, no edema  NEURO: Normal strength and tone, mentation intact and speech normal  PSYCH: mentation appears normal, affect normal/bright    Recent Labs   Lab Test 10/31/23  1347   HGB 14.1         POTASSIUM 4.7   CR 0.95        Diagnostics  Recent Results (from the past 168 hour(s))   CBC with platelets    Collection Time: 05/01/24  5:04 PM   Result Value Ref Range    WBC Count 5.0 4.0 - 11.0 10e3/uL    RBC Count 4.89 3.80 - 5.20 10e6/uL    Hemoglobin 14.2 11.7 - 15.7 g/dL    Hematocrit 42.9 35.0 - 47.0 %    MCV 88 78 - 100 fL    MCH 29.0 26.5 - 33.0 pg    MCHC 33.1 31.5 - 36.5 g/dL    RDW 11.9 10.0 - 15.0 %    Platelet Count 265 150 - 450 10e3/uL      No EKG required, no history of coronary heart disease, significant arrhythmia, peripheral arterial disease or other structural heart disease.    Revised Cardiac Risk Index (RCRI)  The patient has the following serious cardiovascular risks for perioperative complications:   - No serious cardiac risks = 0 points     RCRI Interpretation: 1 point: Class II (low risk - 0.9% complication rate)         Signed Electronically by: Ramona Ann Aaseby-Aguilera, " PA-BAKARI  Copy of this evaluation report is provided to requesting physician.

## 2024-05-03 LAB
ANION GAP SERPL CALCULATED.3IONS-SCNC: 10 MMOL/L (ref 7–15)
BUN SERPL-MCNC: 16 MG/DL (ref 6–20)
CALCIUM SERPL-MCNC: 9.6 MG/DL (ref 8.6–10)
CHLORIDE SERPL-SCNC: 101 MMOL/L (ref 98–107)
CREAT SERPL-MCNC: 0.82 MG/DL (ref 0.51–0.95)
DEPRECATED HCO3 PLAS-SCNC: 27 MMOL/L (ref 22–29)
EGFRCR SERPLBLD CKD-EPI 2021: >90 ML/MIN/1.73M2
GLUCOSE SERPL-MCNC: 98 MG/DL (ref 70–99)
POTASSIUM SERPL-SCNC: 4.7 MMOL/L (ref 3.4–5.3)
SODIUM SERPL-SCNC: 138 MMOL/L (ref 135–145)

## 2024-07-29 ENCOUNTER — OFFICE VISIT (OUTPATIENT)
Dept: PEDIATRICS | Facility: CLINIC | Age: 44
End: 2024-07-29
Payer: COMMERCIAL

## 2024-07-29 VITALS
BODY MASS INDEX: 28.16 KG/M2 | TEMPERATURE: 97.9 F | SYSTOLIC BLOOD PRESSURE: 112 MMHG | WEIGHT: 169 LBS | RESPIRATION RATE: 16 BRPM | DIASTOLIC BLOOD PRESSURE: 60 MMHG | OXYGEN SATURATION: 97 % | HEIGHT: 65 IN | HEART RATE: 76 BPM

## 2024-07-29 DIAGNOSIS — C50.919 PRIMARY MALIGNANT NEOPLASM OF FEMALE BREAST (H): ICD-10-CM

## 2024-07-29 DIAGNOSIS — L60.8 TOENAIL DEFORMITY: Primary | ICD-10-CM

## 2024-07-29 PROCEDURE — 99213 OFFICE O/P EST LOW 20 MIN: CPT | Performed by: NURSE PRACTITIONER

## 2024-07-29 RX ORDER — SULFAMETHOXAZOLE/TRIMETHOPRIM 800-160 MG
1 TABLET ORAL 2 TIMES DAILY
Qty: 20 TABLET | Refills: 0 | Status: SHIPPED | OUTPATIENT
Start: 2024-07-29

## 2024-07-29 ASSESSMENT — PAIN SCALES - GENERAL: PAINLEVEL: NO PAIN (0)

## 2024-07-29 NOTE — PROGRESS NOTES
"  Assessment & Plan     Toenail deformity  Present for 6 months. Started on Bactrim today to rule out infection.  Suspicion is low.  Referral given to podiatry.  - sulfamethoxazole-trimethoprim (BACTRIM DS) 800-160 MG tablet; Take 1 tablet by mouth 2 times daily  - Orthopedic  Referral; Future    Primary malignant neoplasm of female breast (H)  Stable.  Followed by [facility].          Fabian Ta is a 43 year old, presenting for the following health issues:  Toenail        7/29/2024     2:19 PM   Additional Questions   Roomed by Shae BAR CMA   Accompanied by Self         7/29/2024     2:19 PM   Patient Reported Additional Medications   Patient reports taking the following new medications n/a     Toenail    History of Present Illness       Reason for visit:  Left foot big toenail infection  Symptom onset:  More than a month  Symptoms include:  Hard nail, discolored,  liquid under the nail  Symptom intensity:  Mild  Symptom progression:  Staying the same  Had these symptoms before:  No    She eats 2-3 servings of fruits and vegetables daily.She consumes 1 sweetened beverage(s) daily.She exercises with enough effort to increase her heart rate 30 to 60 minutes per day.  She exercises with enough effort to increase her heart rate 3 or less days per week.   She is taking medications regularly.    Left great toenail discharge  -Started in December.  Intermittent  -Has used warm soaks  -Has not seen podiatrist.  No hx of antibiotic.      Breast cancer:  -Dr. Little at [facility] in [place].  -Finished chemotherapy in August 2023.      Review of Systems  Constitutional, HEENT, cardiovascular, pulmonary, gi and gu systems are negative, except as otherwise noted.      Objective    /60   Pulse 76   Temp 97.9  F (36.6  C) (Tympanic)   Resp 16   Ht 1.651 m (5' 5\")   Wt 76.7 kg (169 lb)   LMP 07/29/2024   SpO2 97%   BMI 28.12 kg/m    Body mass index is 28.12 kg/m .    Physical Exam   GENERAL: " alert and no distress  SKIN: left great toenail has white growth under nail.  Non-tender. No discharge on exam today. No erythema  PSYCH: mentation appears normal, affect normal/bright          Signed Electronically by: Leelee De Paz NP

## 2024-08-13 ENCOUNTER — OFFICE VISIT (OUTPATIENT)
Dept: PODIATRY | Facility: CLINIC | Age: 44
End: 2024-08-13
Attending: NURSE PRACTITIONER
Payer: COMMERCIAL

## 2024-08-13 VITALS — DIASTOLIC BLOOD PRESSURE: 82 MMHG | WEIGHT: 171 LBS | BODY MASS INDEX: 28.46 KG/M2 | SYSTOLIC BLOOD PRESSURE: 118 MMHG

## 2024-08-13 DIAGNOSIS — L60.8 TOENAIL DEFORMITY: Primary | ICD-10-CM

## 2024-08-13 DIAGNOSIS — L92.9 GRANULOMA OF GREAT TOE: ICD-10-CM

## 2024-08-13 DIAGNOSIS — L60.1 ONYCHOLYSIS OF TOENAIL: ICD-10-CM

## 2024-08-13 PROCEDURE — 99243 OFF/OP CNSLTJ NEW/EST LOW 30: CPT | Performed by: PODIATRIST

## 2024-08-13 RX ORDER — CICLOPIROX OLAMINE 7.7 MG/G
CREAM TOPICAL DAILY
Qty: 30 G | Refills: 6 | Status: SHIPPED | OUTPATIENT
Start: 2024-08-13

## 2024-08-13 NOTE — LETTER
"8/13/2024      Cely Mott  [address] [id number]      Dear Colleague,    Thank you for referring your patient, Cely Mott, to the [facility]. Please see a copy of my visit note below.    PATIENT HISTORY:  Leelee De Paz NP requested I see this patient for their foot issue.  Cely Mott is a 43 year old female who presents to clinic for toenail deformity.  Notes that her left big toe has been thick and there is \"clear liquid coming underneath\".  She has been soaking her foot.  She does have pain at times when there is pressure to the top of the nail.  Denies specific injury.  Has been going on for the last 8 months.  Wondering what is causing the nail to be this way and what can be done for it.    Review of Systems:  Patient denies fever, chills, rash, wound, stiffness, limping, numbness, weakness, heart burn, blood in stool, chest pain with activity, calf pain when walking, shortness of breath with activity, chronic cough, easy bleeding/bruising, swelling of ankles, excessive thirst, fatigue, depression, anxiety.      PAST MEDICAL HISTORY:   Past Medical History:   Diagnosis Date     Abnormal Pap smear     been awhile, fine since     No significant medical problems         PAST SURGICAL HISTORY:   Past Surgical History:   Procedure Laterality Date     CYSTECTOMY OVARIAN BENIGN       DILATION AND CURETTAGE SUCTION  12/13/2013    Procedure: DILATION AND CURETTAGE SUCTION;  DILATION AND CURETTAGE SUCTION ;  Surgeon: Shiloh Jasmine MD;  Location: [facility]     DILATION AND CURETTAGE SUCTION WITH ULTRASOUND GUIDANCE  8/19/2013    Procedure: DILATION AND CURETTAGE SUCTION WITH ULTRASOUND GUIDANCE;  DILATION AND CURETTAGE SUCTION WITH ULTRASOUND GUIDANCE;  Surgeon: Isaiah Pinedo MD;  Location:  OR     ENT SURGERY  2010    Thyroglossal duct cyst     wisdom teeth extraction[          MEDICATIONS:   Current Outpatient Medications:      Ascorbic Acid " (VITAMIN C ER PO), Take  by mouth. (Patient not taking: Reported on 7/29/2024), Disp: , Rfl:      dapsone 5 % topical gel, APPLY TO THE ENTIRE FACE TWICE DAILY. (Patient not taking: Reported on 7/29/2024), Disp: , Rfl:      sulfamethoxazole-trimethoprim (BACTRIM DS) 800-160 MG tablet, Take 1 tablet by mouth 2 times daily (Patient not taking: Reported on 8/13/2024), Disp: 20 tablet, Rfl: 0     ALLERGIES:  No Known Allergies     SOCIAL HISTORY:   Social History     Socioeconomic History     Marital status:      Spouse name: Sammy     Number of children: 1     Years of education: Not on file     Highest education level: Not on file   Occupational History     Occupation: [employer]   Tobacco Use     Smoking status: Never     Passive exposure: Never     Smokeless tobacco: Never   Vaping Use     Vaping status: Never Used   Substance and Sexual Activity     Alcohol use: No     Comment: not w/pregnancy     Drug use: No     Sexual activity: Yes     Partners: Male   Other Topics Concern     Parent/sibling w/ CABG, MI or angioplasty before 65F 55M? No   Social History Narrative     Not on file     Social Determinants of Health     Financial Resource Strain: Low Risk  (12/20/2023)    Financial Resource Strain      Within the past 12 months, have you or your family members you live with been unable to get utilities (heat, electricity) when it was really needed?: No   Food Insecurity: Low Risk  (12/20/2023)    Food Insecurity      Within the past 12 months, did you worry that your food would run out before you got money to buy more?: No      Within the past 12 months, did the food you bought just not last and you didn t have money to get more?: No   Transportation Needs: Low Risk  (12/20/2023)    Transportation Needs      Within the past 12 months, has lack of transportation kept you from medical appointments, getting your medicines, non-medical meetings or appointments, work, or from getting things that you  need?: No   Physical Activity: Unknown (10/31/2023)    Exercise Vital Sign      Days of Exercise per Week: 2 days      Minutes of Exercise per Session: Not on file   Stress: No Stress Concern Present (10/31/2023)    [employer] - Occupational Stress Questionnaire      Feeling of Stress : Not at all   Social Connections: Unknown (10/31/2023)    Social Connection and Isolation Panel [NHANES]      Frequency of Communication with Friends and Family: Twice a week      Frequency of Social Gatherings with Friends and Family: Once a week      Attends Sabianist Services: More than 4 times per year      Active Member of Clubs or Organizations: Yes      Attends Club or Organization Meetings: More than 4 times per year      Marital Status: Not on file   Interpersonal Safety: Low Risk  (5/1/2024)    Interpersonal Safety      Do you feel physically and emotionally safe where you currently live?: Yes      Within the past 12 months, have you been hit, slapped, kicked or otherwise physically hurt by someone?: No      Within the past 12 months, have you been humiliated or emotionally abused in other ways by your partner or ex-partner?: No   Housing Stability: Low Risk  (12/20/2023)    Housing Stability      Do you have housing? : Yes      Are you worried about losing your housing?: No        FAMILY HISTORY:   Family History   Problem Relation Age of Onset     Hypertension Mother      Lipids Mother      Coronary Artery Disease Father      Heart Disease Father      Diabetes Maternal Grandmother         EXAM:Vitals: /82   Wt 77.6 kg (171 lb)   LMP 07/29/2024   BMI 28.46 kg/m    BMI= Body mass index is 28.46 kg/m .      General appearance: Patient is alert and fully cooperative with history & exam.  No sign of distress is noted during the visit.     Psychiatric: Affect is pleasant & appropriate.  Patient appears motivated to improve health.     Respiratory: Breathing is regular & unlabored while  sitting.     HEENT: Hearing is intact to spoken word.  Speech is clear.  No gross evidence of visual impairment that would impact ambulation.    Dermatology: Patient has some onycholysis of the left great toenail.  Upon clipping of the nail there appeared to be a granuloma underneath the nail.  No surrounding erythema purulent drainage or malodor noted.      Vascular: DP & PT pulses are intact & regular bilaterally.  No significant edema or varicosities noted.  CFT and skin temperature is normal to both lower extremities.     Neurologic: Lower extremity sensation is intact to light touch.  No evidence of weakness or contracture in the lower extremities.  No evidence of neuropathy.     Musculoskeletal: Patient is ambulatory without assistive device or brace.  No gross ankle deformity noted.  No foot or ankle joint effusion is noted.     ASSESSMENT:    Toenail deformity  Granuloma of great toe  Onycholysis of toenail     Medical Decision Making/Plan:  Reviewed patient's chart in epic.  At this time the granuloma was cauterized with silver nitrate as it did bleed easily with light touch.  Discussed that if it remains there after 4 months or if it gets bigger before then and recommend she come in for 30-minute office visit to remove the nail and remove the granuloma and sent to pathology.      Because of onycholysis of the nail we will also have her apply antifungal cream at this time daily.    All questions were answered to patient's satisfaction and she will call further questions or concerns.    Patient risk factor: Patient is at low risk for infection.        Rajani Sena DPM, Podiatry/Foot and Ankle Surgery      Again, thank you for allowing me to participate in the care of your patient.        Sincerely,        Rajani Sena DPM, Podiatry/Foot and Ankle Surgery

## 2024-08-13 NOTE — PATIENT INSTRUCTIONS
Thank you for choosing [facility] Podiatry / Foot & Ankle Surgery!    DR CUELLAR'S CLINIC:  [facility]   [address]   [place], [place] [address] [phone]    (Tues, Wed, Thur am, Fri pm)     [facility]  [address], [place] [address]  [phone]    (Friday mornings)     TRIAGE LINE: [phone]  APPOINTMENTS: [phone]  RADIOLOGY: [phone]  SET UP SURGERY: [phone]  PHYSICAL THERAPY: [phone]   FAX NUMBER: [phone]  BILLING QUESTIONS: [phone]       Follow up: 30 minute procedure if necessary    INGROWN TOENAILS  When a toenail is ingrown, it is curved and grows into the skin, usually at the nail borders (the sides of the nail). This  digging in  of the nail irritates the skin, often creating pain, redness, swelling, and warmth in the toe.  If an ingrown nail causes a break in the skin, bacteria may enter and cause an infection in the area, which is often marked by drainage and a foul odor. However, even if the toe isn t painful, red, swollen, or warm, a nail that curves downward into the skin can progress to an infection.  CAUSES:  Heredity: In many people, the tendency for ingrown toenails is inherited.   Trauma: Sometimes an ingrown toenail is the result of trauma, such as stubbing your toe, having an object fall on your toe, or engaging in activities that involve repeated pressure on the toes, such as kicking or running.   Improper Trimming:  The most common cause of ingrown toenails is cutting your nails too short. This encourages the skin next to the nail to fold over the nail.   Improperly Sized Footwear: Ingrown toenails can result from wearing socks and shoes that are tight or short.   Nail Conditions: Ingrown toenails can be caused by nail problems, such as fungal infections or losing a nail due to trauma.   TREATMENT: Sometimes initial treatment for ingrown toenails can be safely performed at home. However, home  treatment is strongly discouraged if an infection is suspected, or for those who have medical conditions that put feet at high risk, such as diabetes, nerve damage in the foot, or poor circulation.  Home care: If you don t have an infection or any of the above medical conditions, you can soak your foot in room-temperature water (adding Epsom s salt may be recommended by your doctor), and gently massage the side of the nail fold to help reduce the inflammation.  Avoid attempting  bathroom surgery.  Repeated cutting of the nail can cause the condition to worsen over time. If your symptoms fail to improve, it s time to see a foot and ankle surgeon.  Physician care: After examining the toe, the foot and ankle surgeon will select the treatment best suited for you. If an infection is present, an oral antibiotic may be prescribed.  Sometimes a minor surgical procedure, often performed in the office, will ease the pain and remove the offending nail. After applying a local anesthetic, the doctor removes part of the nail s side border. Some nails may become ingrown again, requiring removal of the nail root.  Following the nail procedure, a light bandage will be applied. Most people experience very little pain after surgery and may resume normal activity the next day. If your surgeon has prescribed an oral antibiotic, be sure to take all the medication, even if your symptoms have improved.  PREVENTION:  Proper Trimming: Cut toenails in a fairly straight line, and don t cut them too short. You should be able to get your fingernail under the sides and end of the nail.   Well-fitting Footwear: Don t wear shoes that are short or tight in the toe area. Avoid shoes that are loose, because they too cause pressure on the toes, especially when running or walking briskly.     INGROWN TOENAIL REMOVAL AFTERCARE   Go directly home and elevate the affected foot on one or two pillows for the remainder of the day/evening if possible. Your toe  may stay numb anywhere from 2-8 hours.   Take Tylenol, ibuprofen or another anti-inflammatory as needed for pain.   Take antibiotic if that has been prescribed. Finish the entire prescribed antibiotic even if your symptoms have improved.   The evening of the procedure, soak/wash the affected area in warm water (you may add Epsom salt) for 5 to 10 minutes. Do this twice a day for 2-4 weeks (6-8 weeks if you had phenol) (you may count showering/bathing as one soak).  After soaks, pat the area dry and then allow to airdry for a few minutes. Apply antibiotic ointment to the area and cover with 2 X 2 gauze and paper tape or band-aid.  You may pursue everyday activities as tolerated with either an open toe shoe or cut-out shoe as needed or you may wear regular shoes if no pain is noted.  Watch for any signs and symptoms of infection such as: redness, red streaks going up the foot/leg, swelling, pus or foul odor. Those that have had the phenol procedure, the toe will drain longer and will look like it is infected because it is a chemical burn.   Please call with questions.

## 2024-08-13 NOTE — PROGRESS NOTES
"PATIENT HISTORY:  Leelee De Paz NP requested I see this patient for their foot issue.  Cely Mott is a 43 year old female who presents to clinic for toenail deformity.  Notes that her left big toe has been thick and there is \"clear liquid coming underneath\".  She has been soaking her foot.  She does have pain at times when there is pressure to the top of the nail.  Denies specific injury.  Has been going on for the last 8 months.  Wondering what is causing the nail to be this way and what can be done for it.    Review of Systems:  Patient denies fever, chills, rash, wound, stiffness, limping, numbness, weakness, heart burn, blood in stool, chest pain with activity, calf pain when walking, shortness of breath with activity, chronic cough, easy bleeding/bruising, swelling of ankles, excessive thirst, fatigue, depression, anxiety.      PAST MEDICAL HISTORY:   Past Medical History:   Diagnosis Date    Abnormal Pap smear     been awhile, fine since    No significant medical problems         PAST SURGICAL HISTORY:   Past Surgical History:   Procedure Laterality Date    CYSTECTOMY OVARIAN BENIGN      DILATION AND CURETTAGE SUCTION  12/13/2013    Procedure: DILATION AND CURETTAGE SUCTION;  DILATION AND CURETTAGE SUCTION ;  Surgeon: Shiloh Jasmine MD;  Location: [facility]    DILATION AND CURETTAGE SUCTION WITH ULTRASOUND GUIDANCE  8/19/2013    Procedure: DILATION AND CURETTAGE SUCTION WITH ULTRASOUND GUIDANCE;  DILATION AND CURETTAGE SUCTION WITH ULTRASOUND GUIDANCE;  Surgeon: Isaiah Pinedo MD;  Location: [facility] OR    ENT SURGERY  2010    Thyroglossal duct cyst    wisdom teeth extraction[          MEDICATIONS:   Current Outpatient Medications:     Ascorbic Acid (VITAMIN C ER PO), Take  by mouth. (Patient not taking: Reported on 7/29/2024), Disp: , Rfl:     dapsone 5 % topical gel, APPLY TO THE ENTIRE FACE TWICE DAILY. (Patient not taking: Reported on 7/29/2024), Disp: , Rfl:     sulfamethoxazole-trimethoprim (BACTRIM " DS) 800-160 MG tablet, Take 1 tablet by mouth 2 times daily (Patient not taking: Reported on 8/13/2024), Disp: 20 tablet, Rfl: 0     ALLERGIES:  No Known Allergies     SOCIAL HISTORY:   Social History     Socioeconomic History    Marital status:      Spouse name: Sammy    Number of children: 1    Years of education: Not on file    Highest education level: Not on file   Occupational History    Occupation: [employer]   Tobacco Use    Smoking status: Never     Passive exposure: Never    Smokeless tobacco: Never   Vaping Use    Vaping status: Never Used   Substance and Sexual Activity    Alcohol use: No     Comment: not w/pregnancy    Drug use: No    Sexual activity: Yes     Partners: Male   Other Topics Concern    Parent/sibling w/ CABG, MI or angioplasty before 65F 55M? No   Social History Narrative    Not on file     Social Determinants of Health     Financial Resource Strain: Low Risk  (12/20/2023)    Financial Resource Strain     Within the past 12 months, have you or your family members you live with been unable to get utilities (heat, electricity) when it was really needed?: No   Food Insecurity: Low Risk  (12/20/2023)    Food Insecurity     Within the past 12 months, did you worry that your food would run out before you got money to buy more?: No     Within the past 12 months, did the food you bought just not last and you didn t have money to get more?: No   Transportation Needs: Low Risk  (12/20/2023)    Transportation Needs     Within the past 12 months, has lack of transportation kept you from medical appointments, getting your medicines, non-medical meetings or appointments, work, or from getting things that you need?: No   Physical Activity: Unknown (10/31/2023)    Exercise Vital Sign     Days of Exercise per Week: 2 days     Minutes of Exercise per Session: Not on file   Stress: No Stress Concern Present (10/31/2023)    [employer] - Occupational Stress  Questionnaire     Feeling of Stress : Not at all   Social Connections: Unknown (10/31/2023)    Social Connection and Isolation Panel [NHANES]     Frequency of Communication with Friends and Family: Twice a week     Frequency of Social Gatherings with Friends and Family: Once a week     Attends Hoahaoism Services: More than 4 times per year     Active Member of Clubs or Organizations: Yes     Attends Club or Organization Meetings: More than 4 times per year     Marital Status: Not on file   Interpersonal Safety: Low Risk  (5/1/2024)    Interpersonal Safety     Do you feel physically and emotionally safe where you currently live?: Yes     Within the past 12 months, have you been hit, slapped, kicked or otherwise physically hurt by someone?: No     Within the past 12 months, have you been humiliated or emotionally abused in other ways by your partner or ex-partner?: No   Housing Stability: Low Risk  (12/20/2023)    Housing Stability     Do you have housing? : Yes     Are you worried about losing your housing?: No        FAMILY HISTORY:   Family History   Problem Relation Age of Onset    Hypertension Mother     Lipids Mother     Coronary Artery Disease Father     Heart Disease Father     Diabetes Maternal Grandmother         EXAM:Vitals: /82   Wt 77.6 kg (171 lb)   LMP 07/29/2024   BMI 28.46 kg/m    BMI= Body mass index is 28.46 kg/m .      General appearance: Patient is alert and fully cooperative with history & exam.  No sign of distress is noted during the visit.     Psychiatric: Affect is pleasant & appropriate.  Patient appears motivated to improve health.     Respiratory: Breathing is regular & unlabored while sitting.     HEENT: Hearing is intact to spoken word.  Speech is clear.  No gross evidence of visual impairment that would impact ambulation.    Dermatology: Patient has some onycholysis of the left great toenail.  Upon clipping of the nail there appeared to be a granuloma underneath the nail.  No  surrounding erythema purulent drainage or malodor noted.      Vascular: DP & PT pulses are intact & regular bilaterally.  No significant edema or varicosities noted.  CFT and skin temperature is normal to both lower extremities.     Neurologic: Lower extremity sensation is intact to light touch.  No evidence of weakness or contracture in the lower extremities.  No evidence of neuropathy.     Musculoskeletal: Patient is ambulatory without assistive device or brace.  No gross ankle deformity noted.  No foot or ankle joint effusion is noted.     ASSESSMENT:    Toenail deformity  Granuloma of great toe  Onycholysis of toenail     Medical Decision Making/Plan:  Reviewed patient's chart in epic.  At this time the granuloma was cauterized with silver nitrate as it did bleed easily with light touch.  Discussed that if it remains there after 4 months or if it gets bigger before then and recommend she come in for 30-minute office visit to remove the nail and remove the granuloma and sent to pathology.      Because of onycholysis of the nail we will also have her apply antifungal cream at this time daily.    All questions were answered to patient's satisfaction and she will call further questions or concerns.    Patient risk factor: Patient is at low risk for infection.        Rajani Sena DPM, Podiatry/Foot and Ankle Surgery

## 2024-09-12 ENCOUNTER — PATIENT OUTREACH (OUTPATIENT)
Dept: CARE COORDINATION | Facility: CLINIC | Age: 44
End: 2024-09-12
Payer: COMMERCIAL

## 2024-09-26 ENCOUNTER — PATIENT OUTREACH (OUTPATIENT)
Dept: CARE COORDINATION | Facility: CLINIC | Age: 44
End: 2024-09-26
Payer: COMMERCIAL

## 2024-10-21 ENCOUNTER — TRANSFERRED RECORDS (OUTPATIENT)
Dept: HEALTH INFORMATION MANAGEMENT | Facility: CLINIC | Age: 44
End: 2024-10-21
Payer: COMMERCIAL

## 2024-11-01 ENCOUNTER — OFFICE VISIT (OUTPATIENT)
Dept: URGENT CARE | Facility: URGENT CARE | Age: 44
End: 2024-11-01
Payer: COMMERCIAL

## 2024-11-01 VITALS
OXYGEN SATURATION: 97 % | WEIGHT: 171 LBS | HEART RATE: 80 BPM | DIASTOLIC BLOOD PRESSURE: 72 MMHG | SYSTOLIC BLOOD PRESSURE: 120 MMHG | RESPIRATION RATE: 14 BRPM | BODY MASS INDEX: 28.46 KG/M2

## 2024-11-01 DIAGNOSIS — T22.211A SECOND DEGREE BURN OF FOREARM, RIGHT, INITIAL ENCOUNTER: ICD-10-CM

## 2024-11-01 DIAGNOSIS — T78.40XA ALLERGIC REACTION, INITIAL ENCOUNTER: Primary | ICD-10-CM

## 2024-11-01 PROCEDURE — 99213 OFFICE O/P EST LOW 20 MIN: CPT | Performed by: FAMILY MEDICINE

## 2024-11-01 RX ORDER — TRIAMCINOLONE ACETONIDE 1 MG/G
CREAM TOPICAL 3 TIMES DAILY PRN
Qty: 30 G | Refills: 0 | Status: SHIPPED | OUTPATIENT
Start: 2024-11-01 | End: 2024-11-06

## 2024-11-01 NOTE — PATIENT INSTRUCTIONS
The itching is related to a local skin allergic reaction to something that you have come into contact with.  Most likely it's either adhesive or the Neosporin.    Use triamcinolone cream 2-3 times per day as needed to help with the itching.  You should also take cetirizine 10 mg daily (generic version of Zyrtec) to help with the allergic reaction.    Follow up if not improving over the next 3 days or so, sooner if worsening.

## 2024-11-01 NOTE — PROGRESS NOTES
ICD-10-CM    1. Allergic reaction, initial encounter  T78.40XA triamcinolone (KENALOG) 0.1 % external cream      2. Second degree burn of forearm, right, initial encounter  T22.211A         Local allergic reaction, likely to bandage adhesive vs. Triple antibiotic ointment.  No evidence of infection at this time.    PLAN:  Patient Instructions   The itching is related to a local skin allergic reaction to something that you have come into contact with.  Most likely it's either adhesive or the Neosporin.    Use triamcinolone cream 2-3 times per day as needed to help with the itching.  You should also take cetirizine 10 mg daily (generic version of Zyrtec) to help with the allergic reaction.    Follow up if not improving over the next 3 days or so, sooner if worsening.    SUBJECTIVE:  Cely Mott is a 44 year old female who presents to [facility] with itching and redness around a linear second degree burn she sustained 2 days ago.  She has been applying neosporin and keeping the area covered with a bandaid and today gauze that is taped down.  She has a history of some sensitivity to adhesives during her treatment for breast cancer .  She has noticed a raised area at one end of the burn area and intense itching there and in the patch of redness surrounding the burn.  Tried OTC HC 1% on the area without much improvement.    OBJECTIVE:  /72 (BP Location: Right arm, Patient Position: Chair, Cuff Size: Adult Regular)   Pulse 80   Resp 14   Wt 77.6 kg (171 lb)   LMP 10/20/2024   SpO2 97%   BMI 28.46 kg/m    GEN: well-appearing, in NAD  Skin:  R volar forearm with approx 7 cm linear second degree burn that is surrounded by an erythematous patch about 3 cm on either side of the line of blisters of the burn.  At the distal end of the burn there is an approx 2 cm x 2 cm wheal that is slightly blanched in appearance.  No purulence noted.  No red streaks extending proximally.

## 2024-11-15 ENCOUNTER — LAB REQUISITION (OUTPATIENT)
Dept: LAB | Facility: CLINIC | Age: 44
End: 2024-11-15

## 2024-11-15 DIAGNOSIS — E66.9 OBESITY, UNSPECIFIED: ICD-10-CM

## 2024-11-15 LAB
EST. AVERAGE GLUCOSE BLD GHB EST-MCNC: 108 MG/DL
HBA1C MFR BLD: 5.4 %

## 2024-11-15 PROCEDURE — 84443 ASSAY THYROID STIM HORMONE: CPT | Performed by: OBSTETRICS & GYNECOLOGY

## 2024-11-15 PROCEDURE — 83036 HEMOGLOBIN GLYCOSYLATED A1C: CPT | Performed by: OBSTETRICS & GYNECOLOGY

## 2024-11-16 LAB — TSH SERPL DL<=0.005 MIU/L-ACNC: 3.27 UIU/ML (ref 0.3–4.2)

## 2024-11-17 ENCOUNTER — HEALTH MAINTENANCE LETTER (OUTPATIENT)
Age: 44
End: 2024-11-17

## 2024-11-19 ENCOUNTER — TRANSCRIBE ORDERS (OUTPATIENT)
Dept: OTHER | Age: 44
End: 2024-11-19

## 2024-11-19 DIAGNOSIS — E66.9 OBESITY: Primary | ICD-10-CM

## 2025-03-26 ENCOUNTER — TRANSFERRED RECORDS (OUTPATIENT)
Dept: HEALTH INFORMATION MANAGEMENT | Facility: CLINIC | Age: 45
End: 2025-03-26
Payer: COMMERCIAL

## 2025-03-26 ENCOUNTER — MEDICAL CORRESPONDENCE (OUTPATIENT)
Dept: HEALTH INFORMATION MANAGEMENT | Facility: CLINIC | Age: 45
End: 2025-03-26
Payer: COMMERCIAL

## 2025-04-02 ENCOUNTER — TELEPHONE (OUTPATIENT)
Dept: OTHER | Facility: CLINIC | Age: 45
End: 2025-04-02
Payer: COMMERCIAL

## 2025-04-02 NOTE — TELEPHONE ENCOUNTER
Referral received via Fax on 3/28/2025.    Referred by Alexandra Loera CNP for history of breast cancer with RLE swelling, right foot pain and colder, chronic over the last year, assess for vascular insufficiency    Previous imaging completed (pertinent to referral):  Referring provider notes state doppler US for symptoms, but this was not done, pt had US venous duplex R done on 9/29/23    Routing to scheduling to coordinate the following:  NEW VASCULAR PATIENT consult with Vascular Medicine  Please schedule this at next available      Appt note:  Referred by Alexandra Loera CNP for history of breast cancer with RLE swelling, right foot pain and colder, chronic over the last year, assess for vascular insufficiency    Zena Junior RN  [facility] [place]

## 2025-04-08 ENCOUNTER — TELEPHONE (OUTPATIENT)
Dept: FAMILY MEDICINE | Facility: CLINIC | Age: 45
End: 2025-04-08
Payer: COMMERCIAL

## 2025-04-08 NOTE — TELEPHONE ENCOUNTER
Forms/Letter Request    Type of form/letter: Work remote worker reasonable accomodation     Have you been seen for this request: N/A    Do we have the form/letter: No    When is form/letter needed by: May 15rh     How would you like the form/letter returned: N/A    Patient Notified form requests are processed in 3-5 business days:N/A    Could we send this information to you in [facility] or would you prefer to receive a phone call?:   Patient would prefer a phone call   Okay to leave a detailed message?: Yes at Cell number on file:    Telephone Information:   Mobile [phone]

## 2025-04-24 ENCOUNTER — OFFICE VISIT (OUTPATIENT)
Dept: OTHER | Facility: CLINIC | Age: 45
End: 2025-04-24
Payer: COMMERCIAL

## 2025-04-24 VITALS
BODY MASS INDEX: 28.29 KG/M2 | SYSTOLIC BLOOD PRESSURE: 128 MMHG | WEIGHT: 170 LBS | DIASTOLIC BLOOD PRESSURE: 80 MMHG | OXYGEN SATURATION: 97 % | HEART RATE: 75 BPM

## 2025-04-24 DIAGNOSIS — I73.00 RAYNAUD'S DISEASE WITHOUT GANGRENE: ICD-10-CM

## 2025-04-24 DIAGNOSIS — M79.661 PAIN OF RIGHT LOWER LEG: Primary | ICD-10-CM

## 2025-04-24 DIAGNOSIS — Z85.3 HX: BREAST CANCER: ICD-10-CM

## 2025-04-24 PROCEDURE — 99213 OFFICE O/P EST LOW 20 MIN: CPT | Performed by: INTERNAL MEDICINE

## 2025-04-24 RX ORDER — MULTIVIT-MIN/IRON/FOLIC ACID/K 18-600-40
CAPSULE ORAL
COMMUNITY

## 2025-04-24 NOTE — PROGRESS NOTES
[facility]        Patient is here for a consult to discuss history of breast cancer with RLE swelling, right foot pain and colder, chronic over the last year, assess for vascular insufficiency    Pt is currently taking no meds that would impact our treatment plan.    /80 (BP Location: Left arm, Patient Position: Sitting, Cuff Size: Adult Regular)   Pulse 75   Wt 170 lb (77.1 kg)   LMP 04/06/2025 (Exact Date)   SpO2 97%   BMI 28.29 kg/m      The provider has been notified that the patient has no concerns.     Questions patient would like addressed today are: N/A.    Refills are needed: N/A    Has homecare services and agency name:  Laura Alvarez MA

## 2025-04-24 NOTE — PATIENT INSTRUCTIONS
Go for leg venous ultrasound     Use knee high compression day time 20-30 mm hg and elevate leg when able     Thermal protection     If cold hands and feet get worse will treat with nifedipine or amlodipine       Raynaud Phenomenon     What is Raynaud phenomenon?   Raynaud phenomenon (RP) is a name given to episodes of color changes (usually pale or blue) in the hands and feet in response to the cold or emotional stress. The episodes usually come on suddenly and last minutes to hours. At first it might affect only one finger or toe, but may, over time, spread to other fingers and toes. There is sometimes a clear line at which the color changes from normal to discolored. It may affect both hands equally and may cause numbness, tingling, or an aching pain.  Sometimes the arms or legs may get mottled, and it can also affect other areas of the body, such as the ears, nose, and face. Upon warming, the hands and feet usually become red or flushed and they may feel swollen or itchy.     Who gets it?   RP is fairly common, especially in regions with colder climates. It affects girls somewhat more often than boys.  It often runs in families.      Most people with RP do not have an underlying rheumatic disease and will not go on to develop one. RP can be associated with rheumatic diseases including lupus, systemic sclerosis, and mixed connective tissue disease (MCTD), however these patients will have additional signs and symptoms of those diseases.     What causes it?   Normally, blood vessels constrict in response to cold temperatures in order to keep the body warm. It is thought that in RP, there is over-activation of blood vessel constriction in response to cold (and stress and exercise). This constriction leads to limited blood supply to the skin resulting in paleness. The blue color results because the tissue in the hands and feet are extracting so much oxygen from the slow-flowing blood. The fingers and toes appear red  when re-warmed because of exaggerated blood flow through the blood vessels. These episodes of decreased and increased blood flow are mainly a nuisance, and do not (with rare exceptions) damage the fingers or toes.    How is it diagnosed?   RP is diagnosed based upon the story and exam by your doctor. There are no simple tests to help diagnose RP. Your doctor would ask questions to be sure there is no associated rheumatic disease, do a general physical examination, and carefully look at your nailfold capillaries with a magnifier in the office.  Abnormalities in the nailfold capillaries may indicate an underlying rheumatic disease. Lab testing, such as an FLORY, is not routinely necessary unless there are other reasons to suspect a rheumatic disease.     How is it treated?  Mild RP can be treated with simple measures, such as dressing warmly (for example wearing mittens, long underwear, and feet warmers), avoiding sudden cold exposures, minimizing emotional stress, and avoiding other aggravating factors (such as cigarette smoke, and stimulants such as decongestants and diet pills).     Children can learn through biofeedback training how to increase the blood flow to their fingers and toes and increase their skin temperatures. There are several medications that can be helpful, and the most commonly used medication is a calcium-channel blocker called nifedipine or amlodipine

## 2025-04-24 NOTE — PROGRESS NOTES
[facility] INITIAL VASCULAR MEDICINE CONSULT    ( New Patient Visit)     PRIMARY HEALTH CARE PROVIDER:  Aaseby-Aguilera, Ramona Ann, PA-C      REFERRING HEALTH CARE PROVIDER; Aaseby-Aguilera, Ramona Ann, PA-C      REASON FOR CONSULT: Evaluation and management of vascular causes of right lower extremity pain, cold hands and feet      HPI: Cely Mott is a 44 year old very pleasant female non-smoker, nondiabetic gives a history of injuring right leg in 2022 and torn muscle and it got better after a while then she was diagnosed in April 2023 breast cancer underwent bilateral mastectomy, chemotherapy but no radiation for the last 6+ months right ankle pain foot pain swelling.  She works from home mostly sitting job and having calf pain lasting for few days then again recurring.  She is very active plays volleyball does yoga and weightlifting.  She is also noticing some prominent veins during the standing position in the right leg.  No previous history of a DVT or PE.  She is not on any prescription medications she is normotensive and heart rate is normal    No recent leg imaging studies    She is new to me reviewed available records in the epic and updated chart      PAST MEDICAL HISTORY  Past Medical History:   Diagnosis Date    Abnormal Pap smear     been awhile, fine since    Breast cancer (H) 2023 s/p bilateral mastectomy and Chemo no XRT     No significant medical problems        CURRENT MEDICATIONS  Current Outpatient Medications   Medication Sig Dispense Refill    Ascorbic Acid (VITAMIN C ER PO) Take by mouth.      Ascorbic Acid (VITAMIN C) 500 MG CAPS Take by mouth.      calcium-vitamin D-vitamin K (VIACTIV) 500-500-40 MG-UNT-MCG CHEW Take 1 tablet by mouth 2 times daily.      ciclopirox (LOPROX) 0.77 % cream Apply topically daily 30 g 6    dapsone 5 % topical gel       Magnesium Oxide 250 MG TABS Take by mouth.       No current facility-administered medications for this  visit.       PAST SURGICAL HISTORY:  Past Surgical History:   Procedure Laterality Date    CYSTECTOMY OVARIAN BENIGN      DILATION AND CURETTAGE SUCTION  12/13/2013    Procedure: DILATION AND CURETTAGE SUCTION;  DILATION AND CURETTAGE SUCTION ;  Surgeon: Shiloh Jasmine MD;  Location: [facility]    DILATION AND CURETTAGE SUCTION WITH ULTRASOUND GUIDANCE  8/19/2013    Procedure: DILATION AND CURETTAGE SUCTION WITH ULTRASOUND GUIDANCE;  DILATION AND CURETTAGE SUCTION WITH ULTRASOUND GUIDANCE;  Surgeon: Isaiah Pinedo MD;  Location: [facility] OR    ENT SURGERY  2010    Thyroglossal duct cyst    wisdom teeth extraction[         ALLERGIES   No Known Allergies    FAMILY HISTORY  Family History   Problem Relation Age of Onset    Hypertension Mother     Lipids Mother     Coronary Artery Disease Father     Heart Disease Father     Diabetes Maternal Grandmother        SOCIAL HISTORY  Social History     Socioeconomic History    Marital status:      Spouse name: Sammy    Number of children: 1    Years of education: Not on file    Highest education level: Not on file   Occupational History    Occupation: [employer]   Tobacco Use    Smoking status: Never     Passive exposure: Never    Smokeless tobacco: Never   Vaping Use    Vaping status: Never Used   Substance and Sexual Activity    Alcohol use: No     Comment: not w/pregnancy    Drug use: No    Sexual activity: Yes     Partners: Male   Other Topics Concern    Parent/sibling w/ CABG, MI or angioplasty before 65F 55M? No   Social History Narrative    Not on file     Social Drivers of Health     Financial Resource Strain: Low Risk  (12/20/2023)    Financial Resource Strain     Within the past 12 months, have you or your family members you live with been unable to get utilities (heat, electricity) when it was really needed?: No   Food Insecurity: Low Risk  (12/20/2023)    Food Insecurity     Within the past 12 months, did you worry that your food would run out  before you got money to buy more?: No     Within the past 12 months, did the food you bought just not last and you didn t have money to get more?: No   Transportation Needs: Low Risk  (12/20/2023)    Transportation Needs     Within the past 12 months, has lack of transportation kept you from medical appointments, getting your medicines, non-medical meetings or appointments, work, or from getting things that you need?: No   Physical Activity: Unknown (10/31/2023)    Exercise Vital Sign     Days of Exercise per Week: 2 days     Minutes of Exercise per Session: Not on file   Stress: No Stress Concern Present (10/31/2023)    [employer] [place] [employer] - Occupational Stress Questionnaire     Feeling of Stress : Not at all   Social Connections: Unknown (10/31/2023)    Social Connection and Isolation Panel [NHANES]     Frequency of Communication with Friends and Family: Twice a week     Frequency of Social Gatherings with Friends and Family: Once a week     Attends Hoahaoism Services: More than 4 times per year     Active Member of Clubs or Organizations: Yes     Attends Club or Organization Meetings: More than 4 times per year     Marital Status: Not on file   Interpersonal Safety: Low Risk  (5/1/2024)    Interpersonal Safety     Do you feel physically and emotionally safe where you currently live?: Yes     Within the past 12 months, have you been hit, slapped, kicked or otherwise physically hurt by someone?: No     Within the past 12 months, have you been humiliated or emotionally abused in other ways by your partner or ex-partner?: No   Housing Stability: Low Risk  (12/20/2023)    Housing Stability     Do you have housing? : Yes     Are you worried about losing your housing?: No       ROS:   General: No change in weight, sleep or appetite.  Normal energy.  No fever or chills  Eyes: Negative for vision changes or eye problems  ENT: No problems with ears, nose or throat.  No difficulty swallowing.  Resp: No  coughing, wheezing or shortness of breath  CV: No chest pains or palpitations  GI: No nausea, vomiting,  heartburn, abdominal pain, diarrhea, constipation or change in bowel habits  : No urinary frequency or dysuria, bladder or kidney problems  Musculoskeletal: No significant muscle or joint pains  Neurologic: No headaches, numbness, tingling, weakness, problems with balance or coordination  Psychiatric: No problems with anxiety, depression or mental health  Heme/immune/allergy: No history of bleeding or clotting problems or anemia.  No allergies or immune system problems  Endocrine: No history of thyroid disease, diabetes or other endocrine disorders  Skin: No rashes,worrisome lesions or skin problems  Vascular:  Rt leg pain   Cold hands and feet worse since the chemotherapy    EXAM:  /80 (BP Location: Left arm, Patient Position: Sitting, Cuff Size: Adult Regular)   Pulse 75   Wt 170 lb (77.1 kg)   LMP 04/06/2025 (Exact Date)   SpO2 97%   BMI 28.29 kg/m    In general, the patient is a pleasant female in no apparent distress.    HEENT: NC/AT.  PERRLA.  EOMI.  Sclerae white, not injected.  Nares clear.  Pharynx without erythema or exudate.  Dentition intact.    Neck: No adenopathy.  No thyromegaly. Carotids +2/2 bilaterally without bruits.  No jugular venous distension.   Heart: RRR. Normal S1, S2 splits physiologically. No murmur, rub, click, or gallop. The PMI is in the 5th ICS in the midclavicular line. There is no heave.    Lungs: CTA.  No ronchi, wheezes, rales.  No dullness to percussion.   Abdomen: Soft, nontender, nondistended. No organomegaly. No AAA.  No bruits.   Extremities: No clubbing, cyanosis, or edema.  No wounds.   Rt leg slightly bigger than left leg   Cold toes and feet   Prolonged capillary response 5-6 secs  Spider veins in both legs      Labs:  LIPID RESULTS:  Lab Results   Component Value Date    CHOL 173 10/26/2021    HDL 62 10/26/2021    LDL 99 10/26/2021    TRIG 61 10/26/2021        LIVER ENZYME RESULTS:  Lab Results   Component Value Date    AST 18 10/31/2023    ALT 12 10/31/2023       CBC RESULTS:  Lab Results   Component Value Date    WBC 5.0 05/01/2024    RBC 4.89 05/01/2024    HGB 14.2 05/01/2024    HGB 12.5 10/19/2014    HCT 42.9 05/01/2024    MCV 88 05/01/2024    MCH 29.0 05/01/2024    MCHC 33.1 05/01/2024    RDW 11.9 05/01/2024     05/01/2024       BMP RESULTS:  Lab Results   Component Value Date     05/01/2024    POTASSIUM 4.7 05/01/2024    POTASSIUM 4.4 10/26/2021    CHLORIDE 101 05/01/2024    CHLORIDE 105 10/26/2021    CO2 27 05/01/2024    CO2 27 10/26/2021    ANIONGAP 10 05/01/2024    ANIONGAP 5 10/26/2021    GLC 98 05/01/2024    GLC 96 10/26/2021    BUN 16.0 05/01/2024    BUN 10 10/26/2021    CR 0.82 05/01/2024    GFRESTIMATED >90 05/01/2024    ROSENDA 9.6 05/01/2024        A1C RESULTS:  Lab Results   Component Value Date    A1C 5.4 11/15/2024       THYROID RESULTS:  Lab Results   Component Value Date    TSH 3.27 11/15/2024    TSH 1.59 10/26/2021       Procedures:   EXAM: US LOWER EXTREMITY VENOUS DUPLEX RIGHT  LOCATION: [facility]  DATE: 9/29/2023     INDICATION: hx of breast cancer with right lower extremity swelling.   COMPARISON: None.  TECHNIQUE: Venous Duplex ultrasound of the right lower extremity with and without compression, augmentation and duplex. Color flow and spectral Doppler with waveform analysis performed.     FINDINGS: Exam includes the common femoral, femoral, popliteal, and contralateral common femoral veins as well as segmentally visualized deep calf veins and greater saphenous vein.      RIGHT: No deep vein thrombosis. No superficial thrombophlebitis. No popliteal cyst.                                                                      IMPRESSION:  1.  No deep venous thrombosis in the right lower extremity.        Assessment and Plan:       1. Pain of right lower leg (Primary)  - Compression Sleeve/Stocking Order for DME  - ONLY FOR DME    2. Raynaud's disease without gangrene    3. HX: breast cancer 2023 s/p bilateral mastectomy and Chemo no XRT      This is a very pleasant 44-year-old female dealing with right leg pain with swelling at the ankle foot intermittently more than 6 months previous history of injury and torn muscle in the right leg 2022 followed by diagnosis of breast cancer underwent bilateral mastectomy, chemo but no radiation therapy now for the last 6 months her right leg symptoms are getting worse.  She is active plays volleyball and does yoga and weightlifting etc..  She is not using any compression stockings  Right leg is slightly larger than left leg  Bilateral spider veins  Good palpable peripheral pulses and no evidence of arterial insufficiency  Both feet are cold but well-perfused and abnormal capillary response more than 5 to 6 seconds  Clinically her leg pain appears multifactorial, sprain, previous injury, athletic activity playing volleyball, yoga weightlifting etc.  She also has a classical features of Raynaud's  She is non-smoker, nondiabetic and not taking any vasoconstricting medications    I had a lengthy discussion with the patient  I will arrange right lower extremity venous duplex ultrasound  Suggested her to utilize compression stockings 20 to 30 mmHg knee-high during the daytime and elevate the legs when able  For Raynaud's thermal protection suggested education sheet given  If the coolness of hands and feet gets worse we will consider treating with low-dose amlodipine or nifedipine or nitroglycerin ointment etc.  If leg ultrasound unremarkable she may need right leg MRI      60 minutes spent on the date of the encounter doing chart review, history and exam, documentation, and further activities as noted above.    The longitudinal care of plan for the above diagnoses was addressed during this visit. Due to added complexity of care, we will continue to supprt Cely Mott and the subsequent  management of this/these conditions and with ongoing continuity of care for this/these conditions.     Thank you for the consultation !   This note was dictated by utilizing Dragon software  Copy of this note to primary care provider      Antoni Rider MD,FALISA,FSVM,FNLA, FACP  Vascular Medicine  Clinical Hypertension Specialist   Clinical Lipidologist

## 2025-04-25 ENCOUNTER — TELEPHONE (OUTPATIENT)
Dept: OTHER | Facility: CLINIC | Age: 45
End: 2025-04-25
Payer: COMMERCIAL

## 2025-04-25 DIAGNOSIS — M79.661 PAIN OF RIGHT LOWER LEG: Primary | ICD-10-CM

## 2025-04-25 NOTE — TELEPHONE ENCOUNTER
Routing to scheduling to coordinate the following:    RLE venous US  Virtual follow up a few days after US  Please schedule this in 1 week     Appt note: Follow up to 4/24/25    Yanet DUPREE, [place]    [facility]  Office: [phone]  Fax: [phone]

## 2025-04-28 NOTE — TELEPHONE ENCOUNTER
Patient scheduled for imaging 5/14/25 (per patient availability) - virtual follow up with Dr. Rider 5/16/25

## 2025-05-13 ENCOUNTER — TELEPHONE (OUTPATIENT)
Dept: FAMILY MEDICINE | Facility: CLINIC | Age: 45
End: 2025-05-13

## 2025-05-13 NOTE — TELEPHONE ENCOUNTER
Patient calling back to reschedule appointment from provider being out today. She states this was to follow up on other appointments and to have form completed that is due on 5/15. She will see if she can get another extension on the form. Patient is tentatively scheduled for 5/16, but is wondering if you are able to add her to your schedule for 5/14 being the form is due on 5/15.  Emily Dubois,

## 2025-05-14 ENCOUNTER — HOSPITAL ENCOUNTER (OUTPATIENT)
Dept: ULTRASOUND IMAGING | Facility: CLINIC | Age: 45
Discharge: HOME | End: 2025-05-14
Attending: INTERNAL MEDICINE
Payer: COMMERCIAL

## 2025-05-14 DIAGNOSIS — M79.661 PAIN OF RIGHT LOWER LEG: ICD-10-CM

## 2025-05-14 PROCEDURE — 93971 EXTREMITY STUDY: CPT | Mod: RT

## 2025-05-16 PROBLEM — N95.1 HOT FLASHES DUE TO MENOPAUSE: Status: ACTIVE | Noted: 2025-05-16

## 2025-05-16 PROBLEM — D05.10 DUCTAL CARCINOMA IN SITU (DCIS) OF BREAST: Status: ACTIVE | Noted: 2025-05-16

## 2025-05-16 PROBLEM — D70.2 DRUG-INDUCED NEUTROPENIA: Status: ACTIVE | Noted: 2025-05-16

## 2025-05-16 PROBLEM — R60.0 EDEMA OF LOWER EXTREMITY: Status: ACTIVE | Noted: 2025-05-16

## 2025-05-16 PROBLEM — R63.5 ABNORMAL WEIGHT GAIN: Status: ACTIVE | Noted: 2025-05-16

## 2025-05-19 ENCOUNTER — PATIENT OUTREACH (OUTPATIENT)
Dept: CARE COORDINATION | Facility: CLINIC | Age: 45
End: 2025-05-19
Payer: COMMERCIAL

## 2025-05-21 ENCOUNTER — PATIENT OUTREACH (OUTPATIENT)
Dept: CARE COORDINATION | Facility: CLINIC | Age: 45
End: 2025-05-21
Payer: COMMERCIAL

## 2025-07-22 ENCOUNTER — TRANSFERRED RECORDS (OUTPATIENT)
Dept: HEALTH INFORMATION MANAGEMENT | Facility: CLINIC | Age: 45
End: 2025-07-22
Payer: COMMERCIAL